# Patient Record
Sex: FEMALE | Race: OTHER | HISPANIC OR LATINO | URBAN - METROPOLITAN AREA
[De-identification: names, ages, dates, MRNs, and addresses within clinical notes are randomized per-mention and may not be internally consistent; named-entity substitution may affect disease eponyms.]

---

## 2017-12-28 ENCOUNTER — OUTPATIENT (OUTPATIENT)
Dept: OUTPATIENT SERVICES | Facility: HOSPITAL | Age: 37
LOS: 1 days | Discharge: HOME | End: 2017-12-28

## 2017-12-28 DIAGNOSIS — K70.30 ALCOHOLIC CIRRHOSIS OF LIVER WITHOUT ASCITES: ICD-10-CM

## 2018-08-16 ENCOUNTER — EMERGENCY (EMERGENCY)
Facility: HOSPITAL | Age: 38
LOS: 0 days | Discharge: HOME | End: 2018-08-16
Admitting: PHYSICIAN ASSISTANT

## 2018-08-16 VITALS
WEIGHT: 146.17 LBS | RESPIRATION RATE: 18 BRPM | OXYGEN SATURATION: 98 % | TEMPERATURE: 98 F | HEART RATE: 75 BPM | DIASTOLIC BLOOD PRESSURE: 65 MMHG | SYSTOLIC BLOOD PRESSURE: 122 MMHG

## 2018-08-16 VITALS — WEIGHT: 145.06 LBS | HEIGHT: 63 IN

## 2018-08-16 DIAGNOSIS — R21 RASH AND OTHER NONSPECIFIC SKIN ERUPTION: ICD-10-CM

## 2018-08-16 DIAGNOSIS — T40.2X5A ADVERSE EFFECT OF OTHER OPIOIDS, INITIAL ENCOUNTER: ICD-10-CM

## 2018-08-16 DIAGNOSIS — T39.1X5A ADVERSE EFFECT OF 4-AMINOPHENOL DERIVATIVES, INITIAL ENCOUNTER: ICD-10-CM

## 2018-08-16 RX ORDER — DIPHENHYDRAMINE HCL 50 MG
50 CAPSULE ORAL EVERY 4 HOURS
Qty: 0 | Refills: 0 | Status: DISCONTINUED | OUTPATIENT
Start: 2018-08-16 | End: 2018-08-16

## 2018-08-16 RX ORDER — FAMOTIDINE 10 MG/ML
20 INJECTION INTRAVENOUS DAILY
Qty: 0 | Refills: 0 | Status: DISCONTINUED | OUTPATIENT
Start: 2018-08-16 | End: 2018-08-16

## 2018-08-16 RX ADMIN — Medication 50 MILLIGRAM(S): at 13:47

## 2018-08-16 RX ADMIN — FAMOTIDINE 20 MILLIGRAM(S): 10 INJECTION INTRAVENOUS at 13:47

## 2018-08-16 RX ADMIN — Medication 60 MILLIGRAM(S): at 13:47

## 2018-08-16 NOTE — ED PROVIDER NOTE - PHYSICAL EXAMINATION
CONST: Well appearing in NAD  EYES: PERRL, EOMI, Sclera and conjunctiva clear.   ENT: No nasal discharge. TM's clear B/L without drainage. Oropharynx normal appearing, no erythema or exudates. Uvula midline.  NECK: Non-tender, no meningeal signs  CARD: Normal S1 S2; Normal rate and rhythm  RESP: Equal BS B/L, No wheezes, rhonchi or rales. No distress  GI: Soft, non-tender, non-distended.  MS: Normal ROM in all extremities. No midline spinal tenderness.  SKIN: hives noted on arms and torso.  NEURO: A&Ox3, No focal deficits. Strength 5/5 with no sensory deficits. Steady gait

## 2018-08-16 NOTE — ED PROVIDER NOTE - OBJECTIVE STATEMENT
Pt with percocet addiction took a tylenol w/ codeine this am to prevent withdrawal symtoms. About 15 mins after taking the codeine she developed burning and rash all over body. no SOB, NV or dizziness.

## 2018-08-16 NOTE — SBIRT NOTE. - NSSBIRTSERVICES_GEN_A_ED_FT
Provided SBIRT services: Full screen positive. Referral to Treatment Performed. Screening results were reviewed with the patient and patient was provided information about healthy guidelines and potential negative consequences associated with level of risk. Motivation and readiness to reduce or stop use was discussed and goals and activities to make changes were suggested/offered.   Referral for complete assessment and level of care determination at a certified treatment facility was completed by contacting the treatment facility, [facility] [address] [place] [address], [phone]. Appt confirmed for 8/16/18. Provided patient with referral information and she was in agreement with the plan. She reported her boyfriend will provide transportation to the program upon discharge.  Audit Score: 0  DAST Score: 8  Duration = # 30 Minutes

## 2018-08-16 NOTE — ED PROVIDER NOTE - NS ED ROS FT
CONST: No fever, chills or bodyaches  EYES: No pain, redness, drainage or visual changes.  ENT: No ear pain or discharge, nasal discharge or congestion. No sore throat  CARD: No chest pain, palpitations  RESP: No SOB, cough, hemoptysis. No hx of asthma or COPD  GI: No abdominal pain, N/V/D  MS: No joint pain, back pain or extremity pain/injury  NEURO: No headache, dizziness, paresthesias or LOC

## 2018-08-16 NOTE — ED ADULT NURSE NOTE - NSIMPLEMENTINTERV_GEN_ALL_ED
Implemented All Universal Safety Interventions:  [place] to call system. Call bell, personal items and telephone within reach. Instruct patient to call for assistance. Room bathroom lighting operational. Non-slip footwear when patient is off stretcher. Physically safe environment: no spills, clutter or unnecessary equipment. Stretcher in lowest position, wheels locked, appropriate side rails in place.

## 2018-08-16 NOTE — ED ADULT NURSE NOTE - OBJECTIVE STATEMENT
Pt c/o allergic reaction after taking tylenol-codeine at 1130. Pt c/o itchiness and burning sensation to b/l hands and feet. Denies SOB, or any other symptoms. Pt states she takes 10-15 percocet/a day.

## 2018-08-17 ENCOUNTER — OUTPATIENT (OUTPATIENT)
Dept: OUTPATIENT SERVICES | Facility: HOSPITAL | Age: 38
LOS: 1 days | Discharge: HOME | End: 2018-08-17

## 2018-08-17 DIAGNOSIS — F11.20 OPIOID DEPENDENCE, UNCOMPLICATED: ICD-10-CM

## 2019-05-01 ENCOUNTER — OUTPATIENT (OUTPATIENT)
Dept: OUTPATIENT SERVICES | Facility: HOSPITAL | Age: 39
LOS: 1 days | End: 2019-05-01
Payer: MEDICAID

## 2019-05-04 ENCOUNTER — INPATIENT (INPATIENT)
Facility: HOSPITAL | Age: 39
LOS: 4 days | Discharge: HOME | End: 2019-05-09
Attending: INTERNAL MEDICINE | Admitting: INTERNAL MEDICINE
Payer: MEDICAID

## 2019-05-04 VITALS
SYSTOLIC BLOOD PRESSURE: 144 MMHG | RESPIRATION RATE: 19 BRPM | HEART RATE: 160 BPM | DIASTOLIC BLOOD PRESSURE: 85 MMHG | TEMPERATURE: 98 F | HEIGHT: 63 IN | WEIGHT: 139.99 LBS | OXYGEN SATURATION: 99 %

## 2019-05-04 DIAGNOSIS — Z98.890 OTHER SPECIFIED POSTPROCEDURAL STATES: Chronic | ICD-10-CM

## 2019-05-04 LAB
ALBUMIN SERPL ELPH-MCNC: 4.7 G/DL — SIGNIFICANT CHANGE UP (ref 3.5–5.2)
ALP SERPL-CCNC: 74 U/L — SIGNIFICANT CHANGE UP (ref 30–115)
ALT FLD-CCNC: 15 U/L — SIGNIFICANT CHANGE UP (ref 0–41)
AMMONIA BLD-MCNC: 22 UMOL/L — SIGNIFICANT CHANGE UP (ref 11–55)
ANION GAP SERPL CALC-SCNC: 17 MMOL/L — HIGH (ref 7–14)
APAP SERPL-MCNC: <5 UG/ML — LOW (ref 10–30)
APPEARANCE UR: CLEAR — SIGNIFICANT CHANGE UP
APTT BLD: 28.5 SEC — SIGNIFICANT CHANGE UP (ref 27–39.2)
AST SERPL-CCNC: 53 U/L — HIGH (ref 0–41)
BACTERIA # UR AUTO: ABNORMAL
BASOPHILS # BLD AUTO: 0.06 K/UL — SIGNIFICANT CHANGE UP (ref 0–0.2)
BASOPHILS NFR BLD AUTO: 1.3 % — HIGH (ref 0–1)
BILIRUB SERPL-MCNC: 0.4 MG/DL — SIGNIFICANT CHANGE UP (ref 0.2–1.2)
BILIRUB UR-MCNC: NEGATIVE — SIGNIFICANT CHANGE UP
BUN SERPL-MCNC: 12 MG/DL — SIGNIFICANT CHANGE UP (ref 10–20)
CALCIUM SERPL-MCNC: 9.3 MG/DL — SIGNIFICANT CHANGE UP (ref 8.5–10.1)
CHLORIDE SERPL-SCNC: 99 MMOL/L — SIGNIFICANT CHANGE UP (ref 98–110)
CO2 SERPL-SCNC: 24 MMOL/L — SIGNIFICANT CHANGE UP (ref 17–32)
COLOR SPEC: YELLOW — SIGNIFICANT CHANGE UP
CREAT SERPL-MCNC: 0.6 MG/DL — LOW (ref 0.7–1.5)
DIFF PNL FLD: ABNORMAL
EOSINOPHIL # BLD AUTO: 0.12 K/UL — SIGNIFICANT CHANGE UP (ref 0–0.7)
EOSINOPHIL NFR BLD AUTO: 2.6 % — SIGNIFICANT CHANGE UP (ref 0–8)
EPI CELLS # UR: ABNORMAL /HPF
ETHANOL SERPL-MCNC: 330 MG/DL — HIGH
GLUCOSE SERPL-MCNC: 96 MG/DL — SIGNIFICANT CHANGE UP (ref 70–99)
GLUCOSE UR QL: NEGATIVE MG/DL — SIGNIFICANT CHANGE UP
HCT VFR BLD CALC: 35.7 % — LOW (ref 37–47)
HGB BLD-MCNC: 12.4 G/DL — SIGNIFICANT CHANGE UP (ref 12–16)
IMM GRANULOCYTES NFR BLD AUTO: 0.2 % — SIGNIFICANT CHANGE UP (ref 0.1–0.3)
INR BLD: 1.01 RATIO — SIGNIFICANT CHANGE UP (ref 0.65–1.3)
KETONES UR-MCNC: ABNORMAL
LEUKOCYTE ESTERASE UR-ACNC: NEGATIVE — SIGNIFICANT CHANGE UP
LIDOCAIN IGE QN: 24 U/L — SIGNIFICANT CHANGE UP (ref 7–60)
LYMPHOCYTES # BLD AUTO: 2 K/UL — SIGNIFICANT CHANGE UP (ref 1.2–3.4)
LYMPHOCYTES # BLD AUTO: 44 % — SIGNIFICANT CHANGE UP (ref 20.5–51.1)
MAGNESIUM SERPL-MCNC: 1.6 MG/DL — LOW (ref 1.8–2.4)
MCHC RBC-ENTMCNC: 32.4 PG — HIGH (ref 27–31)
MCHC RBC-ENTMCNC: 34.7 G/DL — SIGNIFICANT CHANGE UP (ref 32–37)
MCV RBC AUTO: 93.2 FL — SIGNIFICANT CHANGE UP (ref 81–99)
MONOCYTES # BLD AUTO: 0.29 K/UL — SIGNIFICANT CHANGE UP (ref 0.1–0.6)
MONOCYTES NFR BLD AUTO: 6.4 % — SIGNIFICANT CHANGE UP (ref 1.7–9.3)
NEUTROPHILS # BLD AUTO: 2.07 K/UL — SIGNIFICANT CHANGE UP (ref 1.4–6.5)
NEUTROPHILS NFR BLD AUTO: 45.5 % — SIGNIFICANT CHANGE UP (ref 42.2–75.2)
NITRITE UR-MCNC: NEGATIVE — SIGNIFICANT CHANGE UP
NRBC # BLD: 0 /100 WBCS — SIGNIFICANT CHANGE UP (ref 0–0)
PH UR: 6 — SIGNIFICANT CHANGE UP (ref 5–8)
PLATELET # BLD AUTO: 117 K/UL — LOW (ref 130–400)
POTASSIUM SERPL-MCNC: 3.9 MMOL/L — SIGNIFICANT CHANGE UP (ref 3.5–5)
POTASSIUM SERPL-SCNC: 3.9 MMOL/L — SIGNIFICANT CHANGE UP (ref 3.5–5)
PROT SERPL-MCNC: 7.9 G/DL — SIGNIFICANT CHANGE UP (ref 6–8)
PROT UR-MCNC: 100 MG/DL
PROTHROM AB SERPL-ACNC: 11.6 SEC — SIGNIFICANT CHANGE UP (ref 9.95–12.87)
RBC # BLD: 3.83 M/UL — LOW (ref 4.2–5.4)
RBC # FLD: 12.8 % — SIGNIFICANT CHANGE UP (ref 11.5–14.5)
RBC CASTS # UR COMP ASSIST: ABNORMAL /HPF
SALICYLATES SERPL-MCNC: <0.3 MG/DL — LOW (ref 4–30)
SODIUM SERPL-SCNC: 140 MMOL/L — SIGNIFICANT CHANGE UP (ref 135–146)
SP GR SPEC: 1.02 — SIGNIFICANT CHANGE UP (ref 1.01–1.03)
UROBILINOGEN FLD QL: 0.2 MG/DL — SIGNIFICANT CHANGE UP (ref 0.2–0.2)
WBC # BLD: 4.55 K/UL — LOW (ref 4.8–10.8)
WBC # FLD AUTO: 4.55 K/UL — LOW (ref 4.8–10.8)
WBC UR QL: SIGNIFICANT CHANGE UP /HPF

## 2019-05-04 PROCEDURE — 99285 EMERGENCY DEPT VISIT HI MDM: CPT

## 2019-05-04 RX ORDER — SODIUM CHLORIDE 9 MG/ML
1000 INJECTION INTRAMUSCULAR; INTRAVENOUS; SUBCUTANEOUS ONCE
Qty: 0 | Refills: 0 | Status: COMPLETED | OUTPATIENT
Start: 2019-05-04 | End: 2019-05-04

## 2019-05-04 NOTE — ED PROVIDER NOTE - ATTENDING CONTRIBUTION TO CARE
I personally evaluated the patient. I reviewed the Resident’s or Physician Assistant’s note (as assigned above), and agree with the findings and plan except as documented in my note.  Chart reviewed. Requesting detox from alcohol and opiates. Exam unremarkable. Will order detox protocol.

## 2019-05-04 NOTE — ED ADULT NURSE REASSESSMENT NOTE - NS ED NURSE REASSESS COMMENT FT1
PT IV symptomatic cold compress applied as per PA. PA wants pt to have oral fluids Pt refused. Pt has no symptoms of withdrawal observed at this time. PT HR 95 at this time. Pt states " MY heart rate is always high " pt observed on her phone talking to friend.

## 2019-05-04 NOTE — ED ADULT TRIAGE NOTE - CHIEF COMPLAINT QUOTE
As per patient I want detox from alcohol and opioids (percocet) and my psoriasis is bothering me" denies wanting to harm self. pt has hx of svt

## 2019-05-04 NOTE — ED ADULT NURSE NOTE - NSIMPLEMENTINTERV_GEN_ALL_ED
Implemented All Universal Safety Interventions:  [place] to call system. Call bell, personal items and telephone within reach. Instruct patient to call for assistance. Room bathroom lighting operational. Non-slip footwear when patient is off stretcher. Physically safe environment: no spills, clutter or unnecessary equipment. Stretcher in lowest position, wheels locked, appropriate side rails in place.

## 2019-05-04 NOTE — ED PROVIDER NOTE - CONSTITUTIONAL, MLM
normal... Well appearing, well nourished, awake, alert, oriented to person, place, time/situation and in no apparent distress. AOB

## 2019-05-05 DIAGNOSIS — I47.1 SUPRAVENTRICULAR TACHYCARDIA: ICD-10-CM

## 2019-05-05 DIAGNOSIS — F19.20 OTHER PSYCHOACTIVE SUBSTANCE DEPENDENCE, UNCOMPLICATED: ICD-10-CM

## 2019-05-05 LAB
HAV IGM SER-ACNC: SIGNIFICANT CHANGE UP
HBV CORE IGM SER-ACNC: SIGNIFICANT CHANGE UP
HBV SURFACE AG SER-ACNC: SIGNIFICANT CHANGE UP
HCV AB S/CO SERPL IA: 0.07 S/CO — SIGNIFICANT CHANGE UP (ref 0–0.99)
HCV AB SERPL-IMP: SIGNIFICANT CHANGE UP

## 2019-05-05 RX ORDER — MAGNESIUM HYDROXIDE 400 MG/1
30 TABLET, CHEWABLE ORAL ONCE
Qty: 0 | Refills: 0 | Status: DISCONTINUED | OUTPATIENT
Start: 2019-05-05 | End: 2019-05-09

## 2019-05-05 RX ORDER — PSEUDOEPHEDRINE HCL 30 MG
60 TABLET ORAL EVERY 6 HOURS
Qty: 0 | Refills: 0 | Status: DISCONTINUED | OUTPATIENT
Start: 2019-05-05 | End: 2019-05-09

## 2019-05-05 RX ORDER — METHADONE HYDROCHLORIDE 40 MG/1
TABLET ORAL
Qty: 0 | Refills: 0 | Status: COMPLETED | OUTPATIENT
Start: 2019-05-05 | End: 2019-05-08

## 2019-05-05 RX ORDER — METHOCARBAMOL 500 MG/1
500 TABLET, FILM COATED ORAL EVERY 6 HOURS
Qty: 0 | Refills: 0 | Status: DISCONTINUED | OUTPATIENT
Start: 2019-05-05 | End: 2019-05-09

## 2019-05-05 RX ORDER — HYDROXYZINE HCL 10 MG
100 TABLET ORAL AT BEDTIME
Qty: 0 | Refills: 0 | Status: DISCONTINUED | OUTPATIENT
Start: 2019-05-05 | End: 2019-05-09

## 2019-05-05 RX ORDER — METHADONE HYDROCHLORIDE 40 MG/1
10 TABLET ORAL ONCE
Qty: 0 | Refills: 0 | Status: DISCONTINUED | OUTPATIENT
Start: 2019-05-05 | End: 2019-05-05

## 2019-05-05 RX ORDER — TUBERCULIN PURIFIED PROTEIN DERIVATIVE 5 [IU]/.1ML
5 INJECTION, SOLUTION INTRADERMAL ONCE
Qty: 0 | Refills: 0 | Status: COMPLETED | OUTPATIENT
Start: 2019-05-05 | End: 2019-05-05

## 2019-05-05 RX ORDER — GUAIFENESIN/DEXTROMETHORPHAN 600MG-30MG
5 TABLET, EXTENDED RELEASE 12 HR ORAL EVERY 4 HOURS
Qty: 0 | Refills: 0 | Status: DISCONTINUED | OUTPATIENT
Start: 2019-05-05 | End: 2019-05-09

## 2019-05-05 RX ORDER — HYDROXYZINE HCL 10 MG
50 TABLET ORAL EVERY 6 HOURS
Qty: 0 | Refills: 0 | Status: DISCONTINUED | OUTPATIENT
Start: 2019-05-05 | End: 2019-05-09

## 2019-05-05 RX ORDER — IBUPROFEN 200 MG
400 TABLET ORAL EVERY 6 HOURS
Qty: 0 | Refills: 0 | Status: DISCONTINUED | OUTPATIENT
Start: 2019-05-05 | End: 2019-05-09

## 2019-05-05 RX ORDER — METHADONE HYDROCHLORIDE 40 MG/1
5 TABLET ORAL EVERY 12 HOURS
Qty: 0 | Refills: 0 | Status: DISCONTINUED | OUTPATIENT
Start: 2019-05-06 | End: 2019-05-08

## 2019-05-05 RX ORDER — MULTIVIT-MIN/FERROUS GLUCONATE 9 MG/15 ML
1 LIQUID (ML) ORAL DAILY
Qty: 0 | Refills: 0 | Status: DISCONTINUED | OUTPATIENT
Start: 2019-05-05 | End: 2019-05-09

## 2019-05-05 RX ORDER — ACETAMINOPHEN 500 MG
650 TABLET ORAL EVERY 4 HOURS
Qty: 0 | Refills: 0 | Status: DISCONTINUED | OUTPATIENT
Start: 2019-05-05 | End: 2019-05-09

## 2019-05-05 RX ORDER — MAGNESIUM OXIDE 400 MG ORAL TABLET 241.3 MG
400 TABLET ORAL
Qty: 0 | Refills: 0 | Status: COMPLETED | OUTPATIENT
Start: 2019-05-05 | End: 2019-05-05

## 2019-05-05 RX ORDER — FOLIC ACID 0.8 MG
1 TABLET ORAL DAILY
Qty: 0 | Refills: 0 | Status: DISCONTINUED | OUTPATIENT
Start: 2019-05-05 | End: 2019-05-09

## 2019-05-05 RX ORDER — METHADONE HYDROCHLORIDE 40 MG/1
10 TABLET ORAL EVERY 12 HOURS
Qty: 0 | Refills: 0 | Status: DISCONTINUED | OUTPATIENT
Start: 2019-05-05 | End: 2019-05-06

## 2019-05-05 RX ORDER — THIAMINE MONONITRATE (VIT B1) 100 MG
100 TABLET ORAL DAILY
Qty: 0 | Refills: 0 | Status: COMPLETED | OUTPATIENT
Start: 2019-05-05 | End: 2019-05-07

## 2019-05-05 RX ADMIN — METHADONE HYDROCHLORIDE 10 MILLIGRAM(S): 40 TABLET ORAL at 21:09

## 2019-05-05 RX ADMIN — Medication 25 MILLIGRAM(S): at 06:45

## 2019-05-05 RX ADMIN — METHADONE HYDROCHLORIDE 10 MILLIGRAM(S): 40 TABLET ORAL at 09:35

## 2019-05-05 RX ADMIN — Medication 300 MILLIGRAM(S): at 06:47

## 2019-05-05 RX ADMIN — Medication 25 MILLIGRAM(S): at 13:40

## 2019-05-05 RX ADMIN — Medication 30 MILLILITER(S): at 18:28

## 2019-05-05 RX ADMIN — MAGNESIUM OXIDE 400 MG ORAL TABLET 400 MILLIGRAM(S): 241.3 TABLET ORAL at 16:32

## 2019-05-05 RX ADMIN — Medication 25 MILLIGRAM(S): at 21:09

## 2019-05-05 RX ADMIN — Medication 400 MILLIGRAM(S): at 11:38

## 2019-05-05 RX ADMIN — Medication 25 MILLIGRAM(S): at 18:17

## 2019-05-05 RX ADMIN — MAGNESIUM OXIDE 400 MG ORAL TABLET 400 MILLIGRAM(S): 241.3 TABLET ORAL at 09:36

## 2019-05-05 RX ADMIN — Medication 25 MILLIGRAM(S): at 11:37

## 2019-05-05 RX ADMIN — METHOCARBAMOL 500 MILLIGRAM(S): 500 TABLET, FILM COATED ORAL at 18:17

## 2019-05-05 RX ADMIN — METHOCARBAMOL 500 MILLIGRAM(S): 500 TABLET, FILM COATED ORAL at 11:37

## 2019-05-05 RX ADMIN — Medication 1 MILLIGRAM(S): at 09:35

## 2019-05-05 RX ADMIN — TUBERCULIN PURIFIED PROTEIN DERIVATIVE 5 UNIT(S): 5 INJECTION, SOLUTION INTRADERMAL at 21:49

## 2019-05-05 RX ADMIN — Medication 25 MILLIGRAM(S): at 09:35

## 2019-05-05 RX ADMIN — Medication 1 TABLET(S): at 09:35

## 2019-05-05 RX ADMIN — Medication 400 MILLIGRAM(S): at 13:44

## 2019-05-05 RX ADMIN — Medication 100 MILLIGRAM(S): at 09:34

## 2019-05-05 NOTE — CHART NOTE - NSCHARTNOTEFT_GEN_A_CORE
PPD PLACED ON RFA, TO BE READ IN 48-72HRS PPD PLACED ON RFA, TO BE READ IN 48-72HRS    0MM induration 5/7/19

## 2019-05-05 NOTE — H&P ADULT - HISTORY OF PRESENT ILLNESS
· Chief Complaint: The patient is a 38y Female complaining of multiple medical complaints.	  · HPI Objective Statement: Patient request detox from ETOH drinks  liquor one liter daily  and percocets 5-10 pills  dailylast used today, last  detox 21/2yrs ago . H/o SVT, No fever, no suicidal ideations,	  · Presenting Symptoms: detox	  · Negative Findings: no nausea, no vomiting	  · Location: general	  · Timing: gradual onset	  · Duration: several mos	  · Quality: ETOH, percocet	  · Severity: MODERATE	    HIV:    HIV Status:  · Offered: Declined	    PAST MEDICAL/SURGICAL/FAMILY/SOCIAL HISTORY:    Past Medical History:  SVT (supraventricular tachycardia).     Past Surgical History:  H/O prior ablation treatment.     Tobacco Usage:  · Tobacco Usage	Current some day smoker	    ALLERGIES AND HOME MEDICATIONS:   Allergies:        Allergies:  	codeine: Drug, Rash    Home Medications:   * No Current Medications as of 04-May-2019 21:32 documented in Structured Notes    REVIEW OF SYSTEMS:    Review of Systems:  · CONSTITUTIONAL: no fever and no chills.	  · EYES: no discharge, no irritation, no pain, no redness, and no visual changes.	  · ENMT: Ears: no ear pain and no hearing problems.Nose: no nasal congestion and no nasal drainage.Mouth/Throat: no dysphagia, no hoarseness and no throat pain.Neck: no lumps, no pain, no stiffness and no swollen glands.	  · CARDIOVASCULAR: no chest pain and no edema.	  · RESPIRATORY: no chest pain, no cough, and no shortness of breath.	  · GASTROINTESTINAL: no abdominal pain, no bloating, no constipation, no diarrhea, no nausea and no vomiting.	  · GENITOURINARY: no dysuria, no frequency, and no hematuria.	  · MUSCULOSKELETAL: no back pain, no gout, no musculoskeletal pain, no neck pain, and no weakness.	  · NEURO: no loss of consciousness, no gait abnormality, no headache, no sensory deficits, and no weakness.	  · PSYCHIATRIC: no known mental health issues.	    PHYSICAL EXAM:   · CONSTITUTIONAL: Well appearing, well nourished, awake, alert, oriented to person, place, time/situation and in no apparent distress. AOB	  · ENMT: Airway patent, Nasal mucosa clear. Mouth with normal mucosa. Throat has no vesicles, no oropharyngeal exudates and uvula is midline.	  · EYES: Clear bilaterally, pupils equal, round and reactive to light.	  · CARDIAC: - - -	  · CARDIAC RHYTHM: regular	  · CARDIAC RATE: TACHYCARDIC	  · RESPIRATORY: Breath sounds clear and equal bilaterally.	  · GASTROINTESTINAL: Abdomen soft, non-tender, no guarding.	  · MUSCULOSKELETAL: Spine appears normal, range of motion is not limited, no muscle or joint tenderness	  · NEUROLOGICAL: Alert and oriented, no focal deficits, no motor or sensory deficits.	  · PSYCHIATRIC: Alert and oriented to person, place, time/situation. normal mood and affect. no apparent risk to self or others.	    CURRENT ORDERS/:   · 	sodium chloride 0.9% Bolus, 1000 milliLiter(s), IV Bolus, once, infuse over 1 Hour(s), Stop After 1 Doses  	Provider's Contact #: [phone], 04-May-2019, Active, 04-May-2019, Standard  · 	Urine Pregnancy Point Of Care Testing,   Frequency:  once, 04-May-2019, Active, Standard    RESULTS:   · EKG Date/Time: 04-May-2019 21:50	  · Rate: 94	  · Interpretation: normal sinus rhythm	  · QRS: 92	  · ST/T Wave: no acute changes

## 2019-05-05 NOTE — H&P ADULT - NSHPLABSRESULTS_GEN_ALL_CORE
12.4   4.55  )-----------( 117      ( 04 May 2019 22:00 )             35.7   05-04    140  |  99  |  12  ----------------------------<  96  3.9   |  24  |  0.6<L>    Ca    9.3      04 May 2019 22:00  Mg     1.6     05-04    TPro  7.9  /  Alb  4.7  /  TBili  0.4  /  DBili  x   /  AST  53<H>  /  ALT  15  /  AlkPhos  74  05-04

## 2019-05-06 LAB
AMPHET UR-MCNC: NEGATIVE — SIGNIFICANT CHANGE UP
BARBITURATES UR SCN-MCNC: NEGATIVE — SIGNIFICANT CHANGE UP
BENZODIAZ UR-MCNC: NEGATIVE — SIGNIFICANT CHANGE UP
COCAINE METAB.OTHER UR-MCNC: POSITIVE
DRUG SCREEN 1, URINE RESULT: SIGNIFICANT CHANGE UP
METHADONE UR-MCNC: NEGATIVE — SIGNIFICANT CHANGE UP
OPIATES UR-MCNC: NEGATIVE — SIGNIFICANT CHANGE UP
PCP UR-MCNC: NEGATIVE — SIGNIFICANT CHANGE UP
PROPOXYPHENE QUALITATIVE URINE RESULT: NEGATIVE — SIGNIFICANT CHANGE UP
T PALLIDUM AB TITR SER: NEGATIVE — SIGNIFICANT CHANGE UP
THC UR QL: NEGATIVE — SIGNIFICANT CHANGE UP

## 2019-05-06 RX ADMIN — Medication 20 MILLIGRAM(S): at 09:18

## 2019-05-06 RX ADMIN — Medication 20 MILLIGRAM(S): at 18:38

## 2019-05-06 RX ADMIN — Medication 100 MILLIGRAM(S): at 09:17

## 2019-05-06 RX ADMIN — METHADONE HYDROCHLORIDE 10 MILLIGRAM(S): 40 TABLET ORAL at 09:18

## 2019-05-06 RX ADMIN — Medication 20 MILLIGRAM(S): at 14:10

## 2019-05-06 RX ADMIN — Medication 20 MILLIGRAM(S): at 21:25

## 2019-05-06 RX ADMIN — Medication 20 MILLIGRAM(S): at 06:23

## 2019-05-06 RX ADMIN — METHADONE HYDROCHLORIDE 5 MILLIGRAM(S): 40 TABLET ORAL at 21:25

## 2019-05-06 RX ADMIN — Medication 1 MILLIGRAM(S): at 09:17

## 2019-05-06 RX ADMIN — Medication 1 TABLET(S): at 09:17

## 2019-05-07 DIAGNOSIS — Z71.89 OTHER SPECIFIED COUNSELING: ICD-10-CM

## 2019-05-07 RX ADMIN — Medication 1 MILLIGRAM(S): at 09:32

## 2019-05-07 RX ADMIN — Medication 1 TABLET(S): at 09:32

## 2019-05-07 RX ADMIN — Medication 15 MILLIGRAM(S): at 13:17

## 2019-05-07 RX ADMIN — METHADONE HYDROCHLORIDE 5 MILLIGRAM(S): 40 TABLET ORAL at 09:31

## 2019-05-07 RX ADMIN — Medication 100 MILLIGRAM(S): at 09:32

## 2019-05-07 RX ADMIN — TUBERCULIN PURIFIED PROTEIN DERIVATIVE 5 UNIT(S): 5 INJECTION, SOLUTION INTRADERMAL at 10:02

## 2019-05-07 RX ADMIN — METHADONE HYDROCHLORIDE 5 MILLIGRAM(S): 40 TABLET ORAL at 20:24

## 2019-05-07 RX ADMIN — Medication 15 MILLIGRAM(S): at 06:41

## 2019-05-07 RX ADMIN — Medication 15 MILLIGRAM(S): at 21:09

## 2019-05-07 RX ADMIN — Medication 15 MILLIGRAM(S): at 17:21

## 2019-05-07 RX ADMIN — Medication 15 MILLIGRAM(S): at 09:31

## 2019-05-07 NOTE — CHART NOTE - NSCHARTNOTEFT_GEN_A_CORE
Subsequent Inpatient Encounter                                       Detox Unit    GABRIELA MENDEZ   38y   Female      Chief Complaint:    Follow up for Polysubstance  Dependency    HPI:     I reviewed previous notes. No Change, except if noted below.             Detail:_    ROS:   I reviewed with patient.  No changes from previous notes except if noted below.             Detail: _    PFSH I reviewed with patient. No changes from previous notes except if noted below.             Detail_    Medication reconciliation performed.    MEDICATIONS  (STANDING):  chlordiazePOXIDE 15 milliGRAM(s) Oral every 4 hours  chlordiazePOXIDE   Oral   folic acid 1 milliGRAM(s) Oral daily  methadone    Tablet 5 milliGRAM(s) Oral every 12 hours  methadone    Tablet   Oral   multivitamin/minerals 1 Tablet(s) Oral daily  thiamine 100 milliGRAM(s) Oral daily      MEDICATIONS  (PRN):  acetaminophen   Tablet .. 650 milliGRAM(s) Oral every 4 hours PRN Temp greater or equal to 38.5C (101.3F), Moderate Pain (4 - 6)  aluminum hydroxide/magnesium hydroxide/simethicone Suspension 30 milliLiter(s) Oral every 6 hours PRN Heartburn  bismuth subsalicylate Liquid 30 milliLiter(s) Oral every 6 hours PRN Diarrhea  chlordiazePOXIDE 25 milliGRAM(s) Oral every 4 hours PRN Withdrawal  cloNIDine 0.1 milliGRAM(s) Oral every 8 hours PRN Blood Pressure GREATER THAN 140/90 mmHG  guaiFENesin/dextromethorphan  Syrup 5 milliLiter(s) Oral every 4 hours PRN Cough  hydrOXYzine hydrochloride 50 milliGRAM(s) Oral every 6 hours PRN Anxiety  hydrOXYzine hydrochloride 100 milliGRAM(s) Oral at bedtime PRN insomnia  ibuprofen  Tablet. 400 milliGRAM(s) Oral every 6 hours PRN Mild Pain (1 - 3)  magnesium hydroxide Suspension 30 milliLiter(s) Oral once PRN Constipation  methocarbamol 500 milliGRAM(s) Oral every 6 hours PRN muscle pain  pseudoephedrine 60 milliGRAM(s) Oral every 6 hours PRN Rhinitis  trimethobenzamide 300 milliGRAM(s) Oral every 6 hours PRN Nausea and/or Vomiting  trimethobenzamide Injectable 200 milliGRAM(s) IntraMuscular every 6 hours PRN Nausea and/or Vomiting      T(C): 36.3 (05-07-19 @ 06:00), Max: 36.3 (05-06-19 @ 11:58)  HR: 69 (05-07-19 @ 06:00) (69 - 95)  BP: 106/63 (05-07-19 @ 06:00) (106/63 - 137/68)  RR: 16 (05-07-19 @ 06:00) (16 - 16)  SpO2: --    PHYSICAL EXAM:      Constitutional: NAD, A&O x3    Eyes: PERRLA, no conjuctivitis    Neck: no lymphadenopathy    Respiratory: +air entry, no rales, no rhonchi, no wheezes    Cardiovascular: +S1 and S2, regular rate and rhythm    Gastrointestinal: +BS, soft, non-tender, not distended    Extremities:  no edema, no calf tenderness    Skin: no rashes, normal turgor            Magnesium, Serum: 1.6 mg/dL (05-04-19 @ 22:00)  Ammonia, Serum: 22 umol/L (05-04-19 @ 22:00)  Treponema Pallidum Antibody Interpretation: Negative (05-04-19 @ 22:00)  Hepatitis B Surface Antigen: Nonreact (05-04-19 @ 22:00)  Hepatitis C Virus S/CO Ratio: 0.07 S/CO (05-04-19 @ 22:00)    Hepatitis C Virus Interpretation: Nonreact (05-04-19 @ 22:00)      Drug Screen 1, Urine Result: Done (05-04-19 @ 22:00)        Impression and Plan:    Primary Diagnosis:  EtOH/Opiate Dependency                                Medication: Librium/Methadone Protocol    Secondary Diagnosis:                                                                                Medication:    Tertiary Diagnosis:                                                                                     Medication:      Continue Detox Protocols. Use of PRNS as needed for withdrawal and comfort.    Adjustments to protocols:    Labs/ Tests reviewed.    Tests ordered:     Likely Disposition: ___Home       ___Rehab       ___Outpatient Program    ___Self Help     _____Other    Estimated Length of stay:__4__

## 2019-05-08 RX ORDER — HYDROCORTISONE 1 %
1 OINTMENT (GRAM) TOPICAL
Qty: 0 | Refills: 0 | Status: DISCONTINUED | OUTPATIENT
Start: 2019-05-08 | End: 2019-05-09

## 2019-05-08 RX ADMIN — Medication 10 MILLIGRAM(S): at 06:38

## 2019-05-08 RX ADMIN — Medication 10 MILLIGRAM(S): at 18:10

## 2019-05-08 RX ADMIN — METHADONE HYDROCHLORIDE 5 MILLIGRAM(S): 40 TABLET ORAL at 09:19

## 2019-05-08 RX ADMIN — METHOCARBAMOL 500 MILLIGRAM(S): 500 TABLET, FILM COATED ORAL at 21:00

## 2019-05-08 RX ADMIN — Medication 400 MILLIGRAM(S): at 21:30

## 2019-05-08 RX ADMIN — Medication 1 APPLICATION(S): at 20:54

## 2019-05-08 RX ADMIN — Medication 1 TABLET(S): at 09:19

## 2019-05-08 RX ADMIN — Medication 10 MILLIGRAM(S): at 20:54

## 2019-05-08 RX ADMIN — Medication 10 MILLIGRAM(S): at 09:19

## 2019-05-08 RX ADMIN — Medication 1 APPLICATION(S): at 13:15

## 2019-05-08 RX ADMIN — Medication 400 MILLIGRAM(S): at 21:00

## 2019-05-08 RX ADMIN — Medication 10 MILLIGRAM(S): at 13:14

## 2019-05-08 RX ADMIN — Medication 1 MILLIGRAM(S): at 09:19

## 2019-05-08 NOTE — CHART NOTE - NSCHARTNOTEFT_GEN_A_CORE
Allergies:  codeine (Rash)      Diet: Regular    Activity: as tolerated    Follow up with    1. PMD in 2 weeks    2. Psych in 2 weeks    3.    Follow up for abnormal labs/tests    1.    Extra Instructions:      Flu Vaccine given  Yes_____         No______      Diagnosis:  Chemical Dependency   Maintain sobriety  refrain from all use      Patient Signature___________________________________________  Date_________________      Nurse Signature_____________________________________________Date_________________

## 2019-05-09 VITALS
HEART RATE: 73 BPM | TEMPERATURE: 97 F | RESPIRATION RATE: 16 BRPM | DIASTOLIC BLOOD PRESSURE: 59 MMHG | SYSTOLIC BLOOD PRESSURE: 103 MMHG

## 2019-05-09 RX ADMIN — Medication 1 APPLICATION(S): at 08:14

## 2019-05-09 RX ADMIN — Medication 1 TABLET(S): at 08:14

## 2019-05-09 RX ADMIN — Medication 1 MILLIGRAM(S): at 08:14

## 2019-05-13 DIAGNOSIS — F10.20 ALCOHOL DEPENDENCE, UNCOMPLICATED: ICD-10-CM

## 2019-05-13 DIAGNOSIS — Z88.8 ALLERGY STATUS TO OTHER DRUGS, MEDICAMENTS AND BIOLOGICAL SUBSTANCES STATUS: ICD-10-CM

## 2019-05-13 DIAGNOSIS — F11.20 OPIOID DEPENDENCE, UNCOMPLICATED: ICD-10-CM

## 2019-05-13 DIAGNOSIS — F17.210 NICOTINE DEPENDENCE, CIGARETTES, UNCOMPLICATED: ICD-10-CM

## 2019-05-14 DIAGNOSIS — Z76.89 PERSONS ENCOUNTERING HEALTH SERVICES IN OTHER SPECIFIED CIRCUMSTANCES: ICD-10-CM

## 2019-07-01 ENCOUNTER — OUTPATIENT (OUTPATIENT)
Dept: OUTPATIENT SERVICES | Facility: HOSPITAL | Age: 39
LOS: 1 days | End: 2019-07-01
Payer: MEDICAID

## 2019-07-01 DIAGNOSIS — Z98.890 OTHER SPECIFIED POSTPROCEDURAL STATES: Chronic | ICD-10-CM

## 2019-07-01 PROCEDURE — G9005: CPT

## 2019-07-12 ENCOUNTER — EMERGENCY (EMERGENCY)
Facility: HOSPITAL | Age: 39
LOS: 0 days | Discharge: HOME | End: 2019-07-12
Attending: EMERGENCY MEDICINE | Admitting: EMERGENCY MEDICINE
Payer: MEDICAID

## 2019-07-12 VITALS
RESPIRATION RATE: 18 BRPM | HEART RATE: 144 BPM | TEMPERATURE: 98 F | DIASTOLIC BLOOD PRESSURE: 73 MMHG | SYSTOLIC BLOOD PRESSURE: 132 MMHG | OXYGEN SATURATION: 98 %

## 2019-07-12 VITALS
SYSTOLIC BLOOD PRESSURE: 133 MMHG | RESPIRATION RATE: 18 BRPM | HEART RATE: 107 BPM | DIASTOLIC BLOOD PRESSURE: 87 MMHG | OXYGEN SATURATION: 98 %

## 2019-07-12 DIAGNOSIS — F19.10 OTHER PSYCHOACTIVE SUBSTANCE ABUSE, UNCOMPLICATED: ICD-10-CM
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
## 2019-07-12 DIAGNOSIS — L40.9 PSORIASIS, UNSPECIFIED: ICD-10-CM

## 2019-07-12 DIAGNOSIS — R21 RASH AND OTHER NONSPECIFIC SKIN ERUPTION: ICD-10-CM

## 2019-07-12 DIAGNOSIS — Z98.890 OTHER SPECIFIED POSTPROCEDURAL STATES: Chronic | ICD-10-CM
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
## 2019-07-12 DIAGNOSIS — Z88.5 ALLERGY STATUS TO NARCOTIC AGENT: ICD-10-CM

## 2019-07-12 DIAGNOSIS — S60.561A INSECT BITE (NONVENOMOUS) OF RIGHT HAND, INITIAL ENCOUNTER: ICD-10-CM
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
## 2019-07-12 PROCEDURE — 99283 EMERGENCY DEPT VISIT LOW MDM: CPT

## 2019-07-12 PROCEDURE — 93010 ELECTROCARDIOGRAM REPORT: CPT

## 2019-07-12 RX ORDER — HALOBETASOL PROPIONATE 0.5 MG/G
1 OINTMENT TOPICAL
Qty: 1 | Refills: 0
Start: 2019-07-12 | End: 2019-07-25

## 2019-07-12 NOTE — ED PROVIDER NOTE - ATTENDING CONTRIBUTION TO CARE
39 y/o female with hx of polysubstance abuse, psoriasis presents to the ED for progression of psoriatic rash x 1 month, also with suspected insect bite to right dorsum of hand sustained yesterday.  No other complaints.  PE:  agree with above.  A/P:  Psoriasis/Insect Bite, no signs of superinfection.  D/C with steroid cream and benadryl.  F/U w/ Derm.

## 2019-07-12 NOTE — ED PROVIDER NOTE - CARE PLAN
Principal Discharge DX:	Psoriasis  Secondary Diagnosis:	Insect bite  Secondary Diagnosis:	Substance abuse

## 2019-07-12 NOTE — ED PROVIDER NOTE - OBJECTIVE STATEMENT
p 38 yold female to Ed [facility] Psoriasis, substance abuse c/o worsening of psoriasis rash x 1 month and insect bite to right dorsal hand x 1 day; pt frustrated about #, size and progression of psoriasis lesions; Was on ? cream with improvement but ran out and did not follow up with derm; pt admits to excessive time in sun, cholerine pools and recently opiods/alcohol;

## 2019-07-12 NOTE — ED PROVIDER NOTE - NSFOLLOWUPINSTRUCTIONS_ED_ALL_ED_FT
Insect Bite, Adult    An insect bite can make your skin red, itchy, and swollen. An insect bite is different from an insect sting, which happens when an insect injects poison (venom) into the skin.    Some insects can spread disease to people through a bite. However, most insect bites do not lead to disease and are not serious.    What are the causes?  Insects may bite for a variety of reasons, including:    Hunger.  To defend themselves.    Insects that bite include:    Spiders.  Mosquitoes.  Ticks.  Fleas.  Ants.  Flies.  Bedbugs.    What are the signs or symptoms?  Symptoms of this condition include:    Itching or pain in the bite area.  Redness and swelling in the bite area.  An open wound (skin ulcer).    In many cases, symptoms last for 2–4 days.    How is this diagnosed?  This condition is usually diagnosed based on symptoms and a physical exam.    How is this treated?  Treatment is usually not needed. Symptoms often go away on their own. When treatment is recommended, it may involve:    Applying a cream or lotion to the bitten area. This treatment helps with itching.  Taking an antibiotic medicine. This treatment is needed if the bite area gets infected.  Getting a tetanus shot.  Applying ice to the affected area.  Medicines called antihistamines. This treatment is needed if you develop an allergic reaction to the insect bite.    Follow these instructions at home:  Bite area care     Do not scratch the bite area.  Keep the bite area clean and dry. Wash it every day with soap and water as told by your health care provider.  Check the bite area every day for signs of infection. Check for:    More redness, swelling, or pain.  Fluid or blood.  Warmth.  Pus.    Managing pain, itching, and swelling       You may apply a baking soda paste, cortisone cream, or calamine lotion to the bite area as told by your health care provider.  If directed, apply ice to the bite area.    Put ice in a plastic bag.  Place a towel between your skin and the bag.  Leave the ice on for 20 minutes, 2–3 times per day.    Medicines     Apply or take over-the-counter and prescription medicines only as told by your health care provider.  If you were prescribed an antibiotic medicine, use it as told by your health care provider. Do not stop using the antibiotic even if your condition improves.  General instructions     Keep all follow-up visits as told by your health care provider. This is important.  How is this prevented?  To help reduce your risk of insect bites:    When you are outdoors, wear clothing that covers your arms and legs.  Use insect repellent. The best insect repellents contain:    DEET, picaridin, oil of lemon eucalyptus (OLE), or [id number].  Higher amounts of an active ingredient.    If your home windows do not have screens, consider installing them.    Contact a health care provider if:  You have more redness, swelling, or pain in the bite area.  You have fluid, blood, or pus coming from the bite area.  The bite area feels warm to the touch.  You have a fever.  Get help right away if:  You have joint pain.  You have a rash.  You have shortness of breath.  You feel unusually tired or sleepy.  You have neck pain.  You have a headache.  You have unusual weakness.  You have chest pain.  You have nausea, vomiting, or pain in the abdomen.        You have a history of psoriasis, and should continue to take your medications as prescribed and follow up with your physician in the outpatient setting.      Alcohol Use Disorder  Alcohol use disorder is when your drinking disrupts your daily life. When you have this condition, you drink too much alcohol and you cannot control your drinking.    Alcohol use disorder can cause serious problems with your physical health. It can affect your brain, heart, liver, pancreas, immune system, stomach, and intestines. Alcohol use disorder can increase your risk for certain cancers and cause problems with your mental health, such as depression, anxiety, psychosis, delirium, and dementia. People with this disorder risk hurting themselves and others.    What are the causes?  This condition is caused by drinking too much alcohol over time. It is not caused by drinking too much alcohol only one or two times. Some people with this condition drink alcohol to cope with or escape from negative life events. Others drink to relieve pain or symptoms of mental illness.    What increases the risk?  You are more likely to develop this condition if:    You have a family history of alcohol use disorder.  Your culture encourages drinking to the point of intoxication, or makes alcohol easy to get.  You had a mood or conduct disorder in childhood.  You have been a victim of abuse.  You are an adolescent and:    You have poor grades or difficulties in school.  Your caregivers do not talk to you about saying no to alcohol, or supervise your activities.  You are impulsive or you have trouble with self-control.      What are the signs or symptoms?  Symptoms of this condition include:    Drinking more than you want to.  Drinking for longer than you want to.  Trying several times to drink less or to control your drinking.  Spending a lot of time getting alcohol, drinking, or recovering from drinking.  Craving alcohol.  Having problems at work, at school, or at home due to drinking.  Having problems in relationships due to drinking.  Drinking when it is dangerous to drink, such as before driving a car.  Continuing to drink even though you know you might have a physical or mental problem related to drinking.  Needing more and more alcohol to get the same effect you want from the alcohol (building up tolerance).  Having symptoms of withdrawal when you stop drinking. Symptoms of withdrawal include:    Fatigue.  Nightmares.  Trouble sleeping.  Depression.  Anxiety.  Fever.  Seizures.  Severe confusion.  Feeling or seeing things that are not there (hallucinations).  Tremors.  Rapid heart rate.  Rapid breathing.  High blood pressure.    Drinking to avoid symptoms of withdrawal.    How is this diagnosed?  This condition is diagnosed with an assessment. Your health care provider may start the assessment by asking three or four questions about your drinking.    Your health care provider may perform a physical exam or do lab tests to see if you have physical problems resulting from alcohol use. She or he may refer you to a mental health professional for evaluation.    How is this treated?  Some people with alcohol use disorder are able to reduce their alcohol use to low-risk levels. Others need to completely quit drinking alcohol. When necessary, mental health professionals with specialized training in substance use treatment can help. Your health care provider can help you decide how severe your alcohol use disorder is and what type of treatment you need. The following forms of treatment are available:    Detoxification. Detoxification involves quitting drinking and using prescription medicines within the first week to help lessen withdrawal symptoms. This treatment is important for people who have had withdrawal symptoms before and for heavy drinkers who are likely to have withdrawal symptoms. Alcohol withdrawal can be dangerous, and in severe cases, it can cause death. Detoxification may be provided in a home, community, or primary care setting, or in a hospital or substance use treatment facility.  Counseling. This treatment is also called talk therapy. It is provided by substance use treatment counselors. A counselor can address the reasons you use alcohol and suggest ways to keep you from drinking again or to prevent problem drinking. The goals of talk therapy are to:    Find healthy activities and ways for you to cope with stress.  Identify and avoid the things that trigger your alcohol use.  Help you learn how to handle cravings.    Medicines. Medicines can help treat alcohol use disorder by:    Decreasing alcohol cravings.  Decreasing the positive feeling you have when you drink alcohol.  Causing an uncomfortable physical reaction when you drink alcohol (aversion therapy).    Support groups. Support groups are led by people who have quit drinking. They provide emotional support, advice, and guidance.    ImageThese forms of treatment are often combined. Some people with this condition benefit from a combination of treatments provided by specialized substance use treatment centers.    Follow these instructions at home:  Take over-the-counter and prescription medicines only as told by your health care provider.  Check with your health care provider before starting any new medicines.  Ask friends and family members not to offer you alcohol.  Avoid situations where alcohol is served, including gatherings where others are drinking alcohol.  Create a plan for what to do when you are tempted to use alcohol.  Find hobbies or activities that you enjoy that do not include alcohol.  Keep all follow-up visits as told by your health care provider. This is important.  How is this prevented?  If you drink, limit alcohol intake to no more than 1 drink a day for nonpregnant women and 2 drinks a day for men. One drink equals 12 oz of beer, 5 oz of wine, or 1½ oz of hard liquor.  If you have a mental health condition, get treatment and support.  Do not give alcohol to adolescents.  If you are an adolescent:    Do not drink alcohol.  Do not be afraid to say no if someone offers you alcohol. Speak up about why you do not want to drink. You can be a positive role model for your friends and set a good example for those around you by not drinking alcohol.  If your friends drink, spend time with others who do not drink alcohol. Make new friends who do not use alcohol.  Find healthy ways to manage stress and emotions, such as meditation or deep breathing, exercise, spending time in nature, listening to music, or talking with a trusted friend or family member.    Contact a health care provider if:  You are not able to take your medicines as told.  Your symptoms get worse.  You return to drinking alcohol (relapse) and your symptoms get worse.  Get help right away if:  You have thoughts about hurting yourself or others.  If you ever feel like you may hurt yourself or others, or have thoughts about taking your own life, get help right away. You can go to your nearest emergency department or call:     Your local emergency services (911 in the [place]   A suicide crisis helpline, such as the National Suicide Prevention Lifeline at 1-966.351.5714. This is open 24 hours a day.     Summary  Alcohol use disorder is when your drinking disrupts your daily life. When you have this condition, you drink too much alcohol and you cannot control your drinking.  Treatment may include detoxification, counseling, medicine, and support groups.  Ask friends and family members not to offer you alcohol. Avoid situations where alcohol is served.  Get help right away if you have thoughts about hurting yourself or others.  This information is not intended to replace advice given to you by your health care provider. Make sure you discuss any questions you have with your health care provider.

## 2019-07-12 NOTE — ED ADULT TRIAGE NOTE - CHIEF COMPLAINT QUOTE
I got bit by something last night and my right hand is swollen, I went to a pool in [place] Monday and since then I have these spots on my leg that are burning ans I have SVT - patient

## 2019-07-12 NOTE — ED ADULT NURSE NOTE - OBJECTIVE STATEMENT
Pt with C/O right hand skin redness swollen for 2 days , left thumb  skin redness pain ,B/L calves rashes rash  skin excoriation for 1 week

## 2019-07-12 NOTE — ED PROVIDER NOTE - NSFOLLOWUPCLINICS_GEN_ALL_ED_FT
[facility]  Detox Mgmt  [address] [place] [address]  Phone: [phone]  Fax:   Follow Up Time: 1-3 Days    [facility]  Dermatology  [address] [place] [id number]  Phone: [phone]  Fax: [phone]  Follow Up Time: 1-3 Days

## 2019-07-12 NOTE — ED PROVIDER NOTE - PROGRESS NOTE DETAILS
pt given steroid cream for psoriasis rash; pt offerd detox at [facility](s) refused; and detox # given to f/u with outpt treatment;

## 2019-07-12 NOTE — ED PROVIDER NOTE - PHYSICAL EXAMINATION
Constitutional: Well developed, well nourished. NAD  Head: Normocephalic, atraumatic.  Eyes: PERRL, EOMI.  ENT: No nasal discharge. Mucous membranes dry.  Neck: Supple. Painless ROM.  Cardiovascular:  Regular rate and rhythm. pt not currently tachycardic HR 90s  Pulmonary: Lungs clear to auscultation bilaterally. No wheezing, rales, or rhonchi.  Abdominal: Soft. Nondistended. No rebound, guarding, rigidity.  Extremities. Pelvis stable. + 3-4 cm irregular patches noted to bilat lower extremities with redness and scaling c/w psoriasis; + mild swelling and erythema noted to right hand dorsum - no central bite lesion; no lymphangitis;  Skin: No rashes, cyanosis.  Neuro: AAOx3. No focal neurological deficits.  Psych: Normal mood. Normal affect.

## 2019-07-12 NOTE — ED ADULT NURSE NOTE - NSIMPLEMENTINTERV_GEN_ALL_ED
Implemented All Fall Risk Interventions:  [place] to call system. Call bell, personal items and telephone within reach. Instruct patient to call for assistance. Room bathroom lighting operational. Non-slip footwear when patient is off stretcher. Physically safe environment: no spills, clutter or unnecessary equipment. Stretcher in lowest position, wheels locked, appropriate side rails in place. Provide visual cue, wrist band, yellow gown, etc. Monitor gait and stability. Monitor for mental status changes and reorient to person, place, and time. Review medications for side effects contributing to fall risk. Reinforce activity limits and safety measures with patient and family.

## 2019-07-13 PROBLEM — I47.1 SUPRAVENTRICULAR TACHYCARDIA: Chronic | Status: ACTIVE | Noted: 2019-05-04

## 2019-07-13 PROBLEM — F19.20 OTHER PSYCHOACTIVE SUBSTANCE DEPENDENCE, UNCOMPLICATED: Chronic | Status: ACTIVE | Noted: 2019-05-05

## 2019-08-01 ENCOUNTER — OUTPATIENT (OUTPATIENT)
Dept: OUTPATIENT SERVICES | Facility: HOSPITAL | Age: 39
LOS: 1 days | End: 2019-08-01
Payer: MEDICAID

## 2019-08-01 DIAGNOSIS — Z98.890 OTHER SPECIFIED POSTPROCEDURAL STATES: Chronic | ICD-10-CM

## 2019-09-03 DIAGNOSIS — Z71.89 OTHER SPECIFIED COUNSELING: ICD-10-CM

## 2019-10-01 ENCOUNTER — INPATIENT (INPATIENT)
Facility: HOSPITAL | Age: 39
LOS: 3 days | Discharge: HOME | End: 2019-10-05
Attending: INTERNAL MEDICINE | Admitting: INTERNAL MEDICINE
Payer: MEDICAID

## 2019-10-01 VITALS
HEART RATE: 156 BPM | HEIGHT: 63 IN | DIASTOLIC BLOOD PRESSURE: 89 MMHG | WEIGHT: 139.99 LBS | TEMPERATURE: 98 F | RESPIRATION RATE: 20 BRPM | OXYGEN SATURATION: 99 % | SYSTOLIC BLOOD PRESSURE: 130 MMHG

## 2019-10-01 DIAGNOSIS — I47.1 SUPRAVENTRICULAR TACHYCARDIA: ICD-10-CM

## 2019-10-01 DIAGNOSIS — F19.20 OTHER PSYCHOACTIVE SUBSTANCE DEPENDENCE, UNCOMPLICATED: ICD-10-CM

## 2019-10-01 DIAGNOSIS — Z98.890 OTHER SPECIFIED POSTPROCEDURAL STATES: Chronic | ICD-10-CM

## 2019-10-01 LAB
ALBUMIN SERPL ELPH-MCNC: 4.9 G/DL — SIGNIFICANT CHANGE UP (ref 3.5–5.2)
ALP SERPL-CCNC: 75 U/L — SIGNIFICANT CHANGE UP (ref 30–115)
ALT FLD-CCNC: 27 U/L — SIGNIFICANT CHANGE UP (ref 0–41)
AMPHET UR-MCNC: NEGATIVE — SIGNIFICANT CHANGE UP
ANION GAP SERPL CALC-SCNC: 12 MMOL/L — SIGNIFICANT CHANGE UP (ref 7–14)
APAP SERPL-MCNC: 15 UG/ML — SIGNIFICANT CHANGE UP (ref 10–30)
APPEARANCE UR: CLEAR — SIGNIFICANT CHANGE UP
AST SERPL-CCNC: 139 U/L — HIGH (ref 0–41)
BACTERIA # UR AUTO: ABNORMAL
BARBITURATES UR SCN-MCNC: NEGATIVE — SIGNIFICANT CHANGE UP
BASOPHILS # BLD AUTO: 0.06 K/UL — SIGNIFICANT CHANGE UP (ref 0–0.2)
BASOPHILS NFR BLD AUTO: 1 % — SIGNIFICANT CHANGE UP (ref 0–1)
BENZODIAZ UR-MCNC: NEGATIVE — SIGNIFICANT CHANGE UP
BILIRUB DIRECT SERPL-MCNC: 0.2 MG/DL — SIGNIFICANT CHANGE UP (ref 0–0.2)
BILIRUB INDIRECT FLD-MCNC: 0.3 MG/DL — SIGNIFICANT CHANGE UP (ref 0.2–1.2)
BILIRUB SERPL-MCNC: 0.5 MG/DL — SIGNIFICANT CHANGE UP (ref 0.2–1.2)
BILIRUB UR-MCNC: NEGATIVE — SIGNIFICANT CHANGE UP
BUN SERPL-MCNC: 7 MG/DL — LOW (ref 10–20)
CALCIUM SERPL-MCNC: 9.3 MG/DL — SIGNIFICANT CHANGE UP (ref 8.5–10.1)
CHLORIDE SERPL-SCNC: 100 MMOL/L — SIGNIFICANT CHANGE UP (ref 98–110)
CK SERPL-CCNC: 235 U/L — HIGH (ref 0–225)
CO2 SERPL-SCNC: 28 MMOL/L — SIGNIFICANT CHANGE UP (ref 17–32)
COCAINE METAB.OTHER UR-MCNC: POSITIVE
COLOR SPEC: YELLOW — SIGNIFICANT CHANGE UP
COMMENT - URINE: SIGNIFICANT CHANGE UP
CREAT SERPL-MCNC: 0.7 MG/DL — SIGNIFICANT CHANGE UP (ref 0.7–1.5)
DIFF PNL FLD: ABNORMAL
DRUG SCREEN 1, URINE RESULT: SIGNIFICANT CHANGE UP
EOSINOPHIL # BLD AUTO: 0.12 K/UL — SIGNIFICANT CHANGE UP (ref 0–0.7)
EOSINOPHIL NFR BLD AUTO: 1.9 % — SIGNIFICANT CHANGE UP (ref 0–8)
EPI CELLS # UR: ABNORMAL /HPF
ETHANOL SERPL-MCNC: 322 MG/DL — HIGH
GLUCOSE SERPL-MCNC: 102 MG/DL — HIGH (ref 70–99)
GLUCOSE UR QL: NEGATIVE MG/DL — SIGNIFICANT CHANGE UP
HCT VFR BLD CALC: 36.7 % — LOW (ref 37–47)
HGB BLD-MCNC: 13.2 G/DL — SIGNIFICANT CHANGE UP (ref 12–16)
IMM GRANULOCYTES NFR BLD AUTO: 0.2 % — SIGNIFICANT CHANGE UP (ref 0.1–0.3)
KETONES UR-MCNC: NEGATIVE — SIGNIFICANT CHANGE UP
LEUKOCYTE ESTERASE UR-ACNC: NEGATIVE — SIGNIFICANT CHANGE UP
LIDOCAIN IGE QN: 20 U/L — SIGNIFICANT CHANGE UP (ref 7–60)
LYMPHOCYTES # BLD AUTO: 2.79 K/UL — SIGNIFICANT CHANGE UP (ref 1.2–3.4)
LYMPHOCYTES # BLD AUTO: 45.1 % — SIGNIFICANT CHANGE UP (ref 20.5–51.1)
MAGNESIUM SERPL-MCNC: 1.6 MG/DL — LOW (ref 1.8–2.4)
MCHC RBC-ENTMCNC: 35.8 PG — HIGH (ref 27–31)
MCHC RBC-ENTMCNC: 36 G/DL — SIGNIFICANT CHANGE UP (ref 32–37)
MCV RBC AUTO: 99.5 FL — HIGH (ref 81–99)
METHADONE UR-MCNC: POSITIVE
MONOCYTES # BLD AUTO: 0.35 K/UL — SIGNIFICANT CHANGE UP (ref 0.1–0.6)
MONOCYTES NFR BLD AUTO: 5.7 % — SIGNIFICANT CHANGE UP (ref 1.7–9.3)
NEUTROPHILS # BLD AUTO: 2.86 K/UL — SIGNIFICANT CHANGE UP (ref 1.4–6.5)
NEUTROPHILS NFR BLD AUTO: 46.1 % — SIGNIFICANT CHANGE UP (ref 42.2–75.2)
NITRITE UR-MCNC: NEGATIVE — SIGNIFICANT CHANGE UP
NRBC # BLD: 0 /100 WBCS — SIGNIFICANT CHANGE UP (ref 0–0)
OPIATES UR-MCNC: NEGATIVE — SIGNIFICANT CHANGE UP
PCP UR-MCNC: NEGATIVE — SIGNIFICANT CHANGE UP
PH UR: 6 — SIGNIFICANT CHANGE UP (ref 5–8)
PLATELET # BLD AUTO: 159 K/UL — SIGNIFICANT CHANGE UP (ref 130–400)
POTASSIUM SERPL-MCNC: 3.5 MMOL/L — SIGNIFICANT CHANGE UP (ref 3.5–5)
POTASSIUM SERPL-SCNC: 3.5 MMOL/L — SIGNIFICANT CHANGE UP (ref 3.5–5)
PROPOXYPHENE QUALITATIVE URINE RESULT: NEGATIVE — SIGNIFICANT CHANGE UP
PROT SERPL-MCNC: 7.8 G/DL — SIGNIFICANT CHANGE UP (ref 6–8)
PROT UR-MCNC: 30 MG/DL
RBC # BLD: 3.69 M/UL — LOW (ref 4.2–5.4)
RBC # FLD: 11 % — LOW (ref 11.5–14.5)
RBC CASTS # UR COMP ASSIST: SIGNIFICANT CHANGE UP /HPF
SALICYLATES SERPL-MCNC: <0.3 MG/DL — LOW (ref 4–30)
SODIUM SERPL-SCNC: 140 MMOL/L — SIGNIFICANT CHANGE UP (ref 135–146)
SP GR SPEC: 1.01 — SIGNIFICANT CHANGE UP (ref 1.01–1.03)
THC UR QL: NEGATIVE — SIGNIFICANT CHANGE UP
UROBILINOGEN FLD QL: 0.2 MG/DL — SIGNIFICANT CHANGE UP (ref 0.2–0.2)
WBC # BLD: 6.19 K/UL — SIGNIFICANT CHANGE UP (ref 4.8–10.8)
WBC # FLD AUTO: 6.19 K/UL — SIGNIFICANT CHANGE UP (ref 4.8–10.8)
WBC UR QL: SIGNIFICANT CHANGE UP /HPF

## 2019-10-01 PROCEDURE — 99285 EMERGENCY DEPT VISIT HI MDM: CPT

## 2019-10-01 RX ORDER — FOLIC ACID 0.8 MG
1 TABLET ORAL DAILY
Refills: 0 | Status: DISCONTINUED | OUTPATIENT
Start: 2019-10-01 | End: 2019-10-05

## 2019-10-01 RX ORDER — METHADONE HYDROCHLORIDE 40 MG/1
TABLET ORAL
Refills: 0 | Status: COMPLETED | OUTPATIENT
Start: 2019-10-01 | End: 2019-10-04

## 2019-10-01 RX ORDER — PSEUDOEPHEDRINE HCL 30 MG
60 TABLET ORAL EVERY 6 HOURS
Refills: 0 | Status: DISCONTINUED | OUTPATIENT
Start: 2019-10-01 | End: 2019-10-05

## 2019-10-01 RX ORDER — METHADONE HYDROCHLORIDE 40 MG/1
5 TABLET ORAL EVERY 12 HOURS
Refills: 0 | Status: DISCONTINUED | OUTPATIENT
Start: 2019-10-02 | End: 2019-10-04

## 2019-10-01 RX ORDER — TUBERCULIN PURIFIED PROTEIN DERIVATIVE 5 [IU]/.1ML
5 INJECTION, SOLUTION INTRADERMAL ONCE
Refills: 0 | Status: COMPLETED | OUTPATIENT
Start: 2019-10-01 | End: 2019-10-01

## 2019-10-01 RX ORDER — THIAMINE MONONITRATE (VIT B1) 100 MG
100 TABLET ORAL DAILY
Refills: 0 | Status: COMPLETED | OUTPATIENT
Start: 2019-10-01 | End: 2019-10-03

## 2019-10-01 RX ORDER — ACETAMINOPHEN 500 MG
650 TABLET ORAL EVERY 4 HOURS
Refills: 0 | Status: DISCONTINUED | OUTPATIENT
Start: 2019-10-01 | End: 2019-10-05

## 2019-10-01 RX ORDER — METHADONE HYDROCHLORIDE 40 MG/1
10 TABLET ORAL EVERY 12 HOURS
Refills: 0 | Status: DISCONTINUED | OUTPATIENT
Start: 2019-10-01 | End: 2019-10-02

## 2019-10-01 RX ORDER — MAGNESIUM HYDROXIDE 400 MG/1
30 TABLET, CHEWABLE ORAL ONCE
Refills: 0 | Status: DISCONTINUED | OUTPATIENT
Start: 2019-10-01 | End: 2019-10-05

## 2019-10-01 RX ORDER — IBUPROFEN 200 MG
400 TABLET ORAL EVERY 6 HOURS
Refills: 0 | Status: DISCONTINUED | OUTPATIENT
Start: 2019-10-01 | End: 2019-10-05

## 2019-10-01 RX ORDER — HYDROXYZINE HCL 10 MG
50 TABLET ORAL EVERY 6 HOURS
Refills: 0 | Status: DISCONTINUED | OUTPATIENT
Start: 2019-10-01 | End: 2019-10-05

## 2019-10-01 RX ORDER — SODIUM CHLORIDE 9 MG/ML
2000 INJECTION INTRAMUSCULAR; INTRAVENOUS; SUBCUTANEOUS ONCE
Refills: 0 | Status: COMPLETED | OUTPATIENT
Start: 2019-10-01 | End: 2019-10-01

## 2019-10-01 RX ORDER — HYDROXYZINE HCL 10 MG
100 TABLET ORAL AT BEDTIME
Refills: 0 | Status: DISCONTINUED | OUTPATIENT
Start: 2019-10-01 | End: 2019-10-05

## 2019-10-01 RX ORDER — MULTIVIT-MIN/FERROUS GLUCONATE 9 MG/15 ML
1 LIQUID (ML) ORAL DAILY
Refills: 0 | Status: DISCONTINUED | OUTPATIENT
Start: 2019-10-01 | End: 2019-10-05

## 2019-10-01 RX ORDER — GUAIFENESIN/DEXTROMETHORPHAN 600MG-30MG
5 TABLET, EXTENDED RELEASE 12 HR ORAL EVERY 4 HOURS
Refills: 0 | Status: DISCONTINUED | OUTPATIENT
Start: 2019-10-01 | End: 2019-10-05

## 2019-10-01 RX ORDER — METHOCARBAMOL 500 MG/1
500 TABLET, FILM COATED ORAL EVERY 6 HOURS
Refills: 0 | Status: DISCONTINUED | OUTPATIENT
Start: 2019-10-01 | End: 2019-10-05

## 2019-10-01 RX ORDER — THIAMINE MONONITRATE (VIT B1) 100 MG
100 TABLET ORAL ONCE
Refills: 0 | Status: COMPLETED | OUTPATIENT
Start: 2019-10-01 | End: 2019-10-01

## 2019-10-01 RX ADMIN — Medication 300 MILLIGRAM(S): at 17:28

## 2019-10-01 RX ADMIN — Medication 1 MILLIGRAM(S): at 09:50

## 2019-10-01 RX ADMIN — Medication 30 MILLILITER(S): at 20:42

## 2019-10-01 RX ADMIN — Medication 100 MILLIGRAM(S): at 02:42

## 2019-10-01 RX ADMIN — Medication 100 MILLIGRAM(S): at 09:50

## 2019-10-01 RX ADMIN — Medication 25 MILLIGRAM(S): at 13:13

## 2019-10-01 RX ADMIN — Medication 1 TABLET(S): at 09:50

## 2019-10-01 RX ADMIN — Medication 50 MILLIGRAM(S): at 02:42

## 2019-10-01 RX ADMIN — SODIUM CHLORIDE 2000 MILLILITER(S): 9 INJECTION INTRAMUSCULAR; INTRAVENOUS; SUBCUTANEOUS at 01:59

## 2019-10-01 RX ADMIN — Medication 25 MILLIGRAM(S): at 09:50

## 2019-10-01 RX ADMIN — Medication 25 MILLIGRAM(S): at 17:28

## 2019-10-01 RX ADMIN — METHADONE HYDROCHLORIDE 10 MILLIGRAM(S): 40 TABLET ORAL at 20:42

## 2019-10-01 RX ADMIN — Medication 25 MILLIGRAM(S): at 20:42

## 2019-10-01 NOTE — H&P ADULT - NSHPLABSRESULTS_GEN_ALL_CORE
13.2   6.19  )-----------( 159      ( 01 Oct 2019 01:55 )             36.7   10-01    140  |  100  |  7<L>  ----------------------------<  102<H>  3.5   |  28  |  0.7    Ca    9.3      01 Oct 2019 01:55  Mg     1.6     10-01    TPro  7.8  /  Alb  4.9  /  TBili  0.5  /  DBili  0.2  /  AST  139<H>  /  ALT  27  /  AlkPhos  75  10-01

## 2019-10-01 NOTE — H&P ADULT - HISTORY OF PRESENT ILLNESS
39 y/o female  · HPI Objective Statement:37 yo  female  presents today for ETOH and drug detox. Pt history of prior detox. pt has no current complaints. Pt states she drinks 1 bottle of hard liqour daily and  abuses  cocaine $20 daily ,percocets  15 pills  daily and street  [id number] mg 2x / wk . Pt denies chest pain, shortness of breath, headache, dizziness, nausea/vomiting/diarrhea, head injury, recent trauma. Denies current thoughts of Suicidal and Homicidal Ideations.	    HIV:    HIV Status:  · Offered: Declined	    PAST MEDICAL/SURGICAL/FAMILY/SOCIAL HISTORY:    Past Medical History:  Polysubstance dependence    SVT (supraventricular tachycardia).     Past Surgical History:  H/O prior ablation treatment.     Tobacco Usage:  · Tobacco Usage	Unknown if ever smoked	    · Attestation Comment: I have reviewed and confirmed nurses' notes for patient's medications, allergies, medical history, and surgical history.	    ALLERGIES AND HOME MEDICATIONS:   Allergies:        Allergies:  	codeine: Drug, Rash    Home Medications:   * Patient Currently Takes Medications as of 12-Jul-2019 22:38 documented in Structured Notes  · 	halobetasol 0.05% topical cream: Apply topically to affected area 2 times a day     REVIEW OF SYSTEMS:    Review of Systems:  · Review of Systems: Constitutional: (-) fever  Eyes/ENT: (-) blurry vision, (-) epistaxis  Cardiovascular: (-) chest pain, (-) syncope  Respiratory: (-) cough, (-) shortness of breath  Gastrointestinal: (-) vomiting, (-) diarrhea  Musculoskeletal: (-) neck pain, (-) back pain, (-) joint pain  Integumentary: (-) rash, (-) edema  Neurological: (-) headache, (-) altered mental status  Psychiatric: (-) hallucinations Allergic/Immunologic: (-) pruritus	    PHYSICAL EXAM:   · Physical Examination: Physical Exam   Vital Signs: I have reviewed the initial vital signs.  Constitutional: well-nourished, appears stated age, no acute distress  Eyes: Conjunctiva pink, Sclera clear, PERRLA, EOMI.  Cardiovascular: S1 and S2, regular rate, regular rhythm, well-perfused extremities, radial pulses equal and 2+  Respiratory: unlabored respiratory effort, clear to auscultation bilaterally no wheezing, rales and rhonchi  Gastrointestinal: soft, non-tender abdomen, no pulsatile mass, normal bowl sounds  Musculoskeletal: supple neck, no lower extremity edema, no midline tenderness  Integumentary: warm, dry, no rash  Neurologic: awake, alert, cranial nerves II-XII grossly intact, extremities’ motor and sensory functions grossly intact Psychiatric: appropriate mood, appropriate affect

## 2019-10-01 NOTE — ED ADULT TRIAGE NOTE - CHIEF COMPLAINT QUOTE
I need detox from alcohol, methadone, cocaine.  I drink 750cc day of alcohol.  Last time I tried to detox I went into dt's.  I use liquid methadone and 10 percocet per day.  I use cocaine on occasion.

## 2019-10-01 NOTE — ED PROVIDER NOTE - CLINICAL SUMMARY MEDICAL DECISION MAKING FREE TEXT BOX
Pt pw ETOH use. No Si No Hi. No signs of trauma no active withdrawal  pt medically cleared for detox admit

## 2019-10-01 NOTE — H&P ADULT - ATTENDING COMMENTS
Patient interviewed and examined.    Chart reviewed.    PA's H&P noted and modified, as appropriate.    Case discussed on team rounds    Following is my summary of the case.    Admitted for detox: from __x__ED, ___Intake, ____Med/Surg Floor    Alcohol__x__   Opioid_____  Benzo___ Other_____    Substance amount, duration of use, last usage, and prior attempts at detox or rehabs, are outlined above in the H&P and discussed with patient.    Associated withdrawal symptoms presents.  Comorbid conditions noted. Chronic and Stable.    Past Medical Hx, Psych Hx, family Hx, Social Hx from H&P reviewed and NO changes.    Old medical record and medication Hx. Reviewed    Following items reviewed and addressed:  1. labs  2. EKG  3. Imaging from PACs module    Examination: no change from PA's exam.    Place on following protocol  _____Medically Managed  __X__Medically Supervised    Ciwa_____Librium taper_x___Ativan taper___Methadone taper___ Phenobarb taper____ Suboxone Induction____MMTP____    Narcan Kit Offered    Psych Consult __X__N/A  ___Ordered    Physical Therapy  ___X n/a   ___  Ordered    Aftercare disposition to be addressed by counselors.    Estimated length of stay 3-5 days.

## 2019-10-01 NOTE — ED PROVIDER NOTE - OBJECTIVE STATEMENT
Pt presents today for ETOH and drug detox. Pt history of prior detox. pt has no current complaints. Pt states she drinks 1 bottle of hard liqour, cocaine, methadone. Pt denies chest pain, shortness of breath, headache, dizziness, nausea/vomiting/diarrhea, head injury, recent trauma. Denies current thoughts of Suicidal and Homicidal Ideations.

## 2019-10-02 RX ORDER — KETOCONAZOLE 20 MG/G
1 AEROSOL, FOAM TOPICAL
Refills: 0 | Status: DISCONTINUED | OUTPATIENT
Start: 2019-10-02 | End: 2019-10-05

## 2019-10-02 RX ADMIN — METHADONE HYDROCHLORIDE 10 MILLIGRAM(S): 40 TABLET ORAL at 09:20

## 2019-10-02 RX ADMIN — METHADONE HYDROCHLORIDE 5 MILLIGRAM(S): 40 TABLET ORAL at 20:42

## 2019-10-02 RX ADMIN — Medication 20 MILLIGRAM(S): at 17:42

## 2019-10-02 RX ADMIN — Medication 1 APPLICATION(S): at 20:42

## 2019-10-02 RX ADMIN — Medication 1 APPLICATION(S): at 09:18

## 2019-10-02 RX ADMIN — KETOCONAZOLE 1 APPLICATION(S): 20 AEROSOL, FOAM TOPICAL at 17:42

## 2019-10-02 RX ADMIN — Medication 100 MILLIGRAM(S): at 09:20

## 2019-10-02 RX ADMIN — Medication 20 MILLIGRAM(S): at 13:13

## 2019-10-02 RX ADMIN — Medication 1 MILLIGRAM(S): at 09:20

## 2019-10-02 RX ADMIN — Medication 20 MILLIGRAM(S): at 20:42

## 2019-10-02 RX ADMIN — Medication 25 MILLIGRAM(S): at 06:51

## 2019-10-02 RX ADMIN — Medication 1 TABLET(S): at 09:20

## 2019-10-02 RX ADMIN — Medication 20 MILLIGRAM(S): at 09:20

## 2019-10-03 RX ORDER — MAGNESIUM OXIDE 400 MG ORAL TABLET 241.3 MG
400 TABLET ORAL
Refills: 0 | Status: COMPLETED | OUTPATIENT
Start: 2019-10-03 | End: 2019-10-05

## 2019-10-03 RX ADMIN — MAGNESIUM OXIDE 400 MG ORAL TABLET 400 MILLIGRAM(S): 241.3 TABLET ORAL at 17:26

## 2019-10-03 RX ADMIN — MAGNESIUM OXIDE 400 MG ORAL TABLET 400 MILLIGRAM(S): 241.3 TABLET ORAL at 11:16

## 2019-10-03 RX ADMIN — METHADONE HYDROCHLORIDE 5 MILLIGRAM(S): 40 TABLET ORAL at 21:07

## 2019-10-03 RX ADMIN — Medication 1 TABLET(S): at 09:14

## 2019-10-03 RX ADMIN — Medication 15 MILLIGRAM(S): at 09:14

## 2019-10-03 RX ADMIN — Medication 100 MILLIGRAM(S): at 09:14

## 2019-10-03 RX ADMIN — Medication 15 MILLIGRAM(S): at 13:11

## 2019-10-03 RX ADMIN — MAGNESIUM OXIDE 400 MG ORAL TABLET 400 MILLIGRAM(S): 241.3 TABLET ORAL at 12:47

## 2019-10-03 RX ADMIN — Medication 20 MILLIGRAM(S): at 06:30

## 2019-10-03 RX ADMIN — METHADONE HYDROCHLORIDE 5 MILLIGRAM(S): 40 TABLET ORAL at 09:14

## 2019-10-03 RX ADMIN — Medication 1 APPLICATION(S): at 09:14

## 2019-10-03 RX ADMIN — Medication 15 MILLIGRAM(S): at 21:07

## 2019-10-03 RX ADMIN — Medication 1 MILLIGRAM(S): at 09:14

## 2019-10-03 RX ADMIN — Medication 15 MILLIGRAM(S): at 17:26

## 2019-10-03 NOTE — CHART NOTE - NSCHARTNOTEFT_GEN_A_CORE
Subsequent Inpatient Encounter                                       Detox Unit    GABRIELA MENDEZ   38y   Female      Chief Complaint:    Follow up for Alcohol  Dependency    HPI:     I reviewed previous notes. No Change, except if noted below.             Detail:_    ROS:   I reviewed with patient.  No changes from previous notes except if noted below.             Detail: _    PFSH I reviewed with patient. No changes from previous notes except if noted below.             Detail_    Medication reconciliation performed.    MEDICATIONS  (STANDING):  chlordiazePOXIDE 15 milliGRAM(s) Oral every 4 hours  chlordiazePOXIDE   Oral   clobetasol 0.05% Cream 1 Application(s) Topical every 12 hours  folic acid 1 milliGRAM(s) Oral daily  ketoconazole 2% Shampoo 1 Application(s) Topical <User Schedule>  methadone    Tablet   Oral   methadone    Tablet 5 milliGRAM(s) Oral every 12 hours  multivitamin/minerals 1 Tablet(s) Oral daily  thiamine 100 milliGRAM(s) Oral daily      MEDICATIONS  (PRN):  acetaminophen   Tablet .. 650 milliGRAM(s) Oral every 4 hours PRN Temp greater or equal to 38.5C (101.3F), Moderate Pain (4 - 6)  aluminum hydroxide/magnesium hydroxide/simethicone Suspension 30 milliLiter(s) Oral every 6 hours PRN Heartburn  bismuth subsalicylate Liquid 30 milliLiter(s) Oral every 6 hours PRN Diarrhea  chlordiazePOXIDE 25 milliGRAM(s) Oral every 4 hours PRN Withdrawal  cloNIDine 0.1 milliGRAM(s) Oral every 8 hours PRN Blood Pressure GREATER THAN 140/90 mmHG  cloNIDine 0.1 milliGRAM(s) Oral every 8 hours PRN opiate withdrawal  guaiFENesin/dextromethorphan  Syrup 5 milliLiter(s) Oral every 4 hours PRN Cough  hydrOXYzine hydrochloride 50 milliGRAM(s) Oral every 6 hours PRN Anxiety  hydrOXYzine hydrochloride 100 milliGRAM(s) Oral at bedtime PRN insomnia  ibuprofen  Tablet. 400 milliGRAM(s) Oral every 6 hours PRN Mild Pain (1 - 3)  magnesium hydroxide Suspension 30 milliLiter(s) Oral once PRN Constipation  methocarbamol 500 milliGRAM(s) Oral every 6 hours PRN muscle pain  pseudoephedrine 60 milliGRAM(s) Oral every 6 hours PRN Rhinitis  trimethobenzamide 300 milliGRAM(s) Oral every 6 hours PRN Nausea and/or Vomiting  trimethobenzamide Injectable 200 milliGRAM(s) IntraMuscular every 6 hours PRN Nausea and/or Vomiting      T(C): 36.2 (10-03-19 @ 06:00), Max: 37.2 (10-02-19 @ 12:21)  HR: 80 (10-03-19 @ 06:00) (76 - 91)  BP: 107/67 (10-03-19 @ 06:00) (101/57 - 132/68)  RR: 16 (10-03-19 @ 06:00) (14 - 17)  SpO2: --    PHYSICAL EXAM:      Constitutional: NAD, A&O x3    Eyes: PERRLA, no conjuctivitis    Neck: no lymphadenopathy    Respiratory: +air entry, no rales, no rhonchi, no wheezes    Cardiovascular: +S1 and S2, regular rate and rhythm    Gastrointestinal: +BS, soft, non-tender, not distended    Extremities:  no edema, no calf tenderness    Skin: no rashes, normal turgor            Magnesium, Serum: 1.6 mg/dL (10-01-19 @ 01:55)        Drug Screen 1, Urine Result: Done (10-01-19 @ 02:45)        Impression and Plan:    Primary Diagnosis:  Alcohol Dependency                                Medication: Librium Protocol/ CIWA Protocol    Secondary Diagnosis:  Dermatitis                                                                Medication: on meds    Tertiary Diagnosis:                                                                       Medication      Continue Detox Protocols. Use of PRNS as needed for withdrawal and comfort.    Adjustments to protocols:    Labs/ Tests reviewed.    Tests ordered:     Likely Disposition: __X_Home       ___Rehab       ___Outpatient Program    ___Self Help     _____Other    Estimated Length of stay:_4___

## 2019-10-04 RX ADMIN — Medication 1 MILLIGRAM(S): at 08:40

## 2019-10-04 RX ADMIN — Medication 1 TABLET(S): at 08:40

## 2019-10-04 RX ADMIN — Medication 10 MILLIGRAM(S): at 13:08

## 2019-10-04 RX ADMIN — Medication 15 MILLIGRAM(S): at 05:57

## 2019-10-04 RX ADMIN — METHADONE HYDROCHLORIDE 5 MILLIGRAM(S): 40 TABLET ORAL at 08:40

## 2019-10-04 RX ADMIN — MAGNESIUM OXIDE 400 MG ORAL TABLET 400 MILLIGRAM(S): 241.3 TABLET ORAL at 08:40

## 2019-10-04 RX ADMIN — MAGNESIUM OXIDE 400 MG ORAL TABLET 400 MILLIGRAM(S): 241.3 TABLET ORAL at 11:09

## 2019-10-04 RX ADMIN — Medication 10 MILLIGRAM(S): at 17:45

## 2019-10-04 RX ADMIN — MAGNESIUM OXIDE 400 MG ORAL TABLET 400 MILLIGRAM(S): 241.3 TABLET ORAL at 15:32

## 2019-10-04 RX ADMIN — Medication 10 MILLIGRAM(S): at 09:03

## 2019-10-04 RX ADMIN — Medication 10 MILLIGRAM(S): at 21:28

## 2019-10-04 RX ADMIN — Medication 30 MILLILITER(S): at 12:02

## 2019-10-04 RX ADMIN — Medication 1 APPLICATION(S): at 08:40

## 2019-10-04 NOTE — CHART NOTE - NSCHARTNOTEFT_GEN_A_CORE
discharge note      Allergies:  codeine (Rash)      Diet: Regular    Activity: as tolerated    Follow up with    1. PMD in 2 weeks    2. Psych in 2 weeks    3.    Follow up for abnormal labs/tests    1.    Extra Instructions:      Flu Vaccine given  Yes_____         No______      Diagnosis:  Chemical Dependency   Maintain sobriety  refrain from all use      Patient Signature___________________________________________  Date_________________      Nurse Signature_____________________________________________Date_________________

## 2019-10-05 VITALS
RESPIRATION RATE: 14 BRPM | SYSTOLIC BLOOD PRESSURE: 114 MMHG | TEMPERATURE: 97 F | HEART RATE: 72 BPM | DIASTOLIC BLOOD PRESSURE: 69 MMHG

## 2019-10-05 RX ADMIN — Medication 1 MILLIGRAM(S): at 09:40

## 2019-10-05 RX ADMIN — MAGNESIUM OXIDE 400 MG ORAL TABLET 400 MILLIGRAM(S): 241.3 TABLET ORAL at 09:40

## 2019-10-05 RX ADMIN — Medication 10 MILLIGRAM(S): at 06:28

## 2019-10-05 RX ADMIN — Medication 1 TABLET(S): at 09:40
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
## 2019-10-11 DIAGNOSIS — F19.20 OTHER PSYCHOACTIVE SUBSTANCE DEPENDENCE, UNCOMPLICATED: ICD-10-CM

## 2019-10-11 DIAGNOSIS — I47.1 SUPRAVENTRICULAR TACHYCARDIA: ICD-10-CM

## 2019-10-11 DIAGNOSIS — F10.10 ALCOHOL ABUSE, UNCOMPLICATED: ICD-10-CM

## 2019-12-01 PROCEDURE — G9005: CPT

## 2019-12-20 ENCOUNTER — INPATIENT (INPATIENT)
Facility: HOSPITAL | Age: 39
LOS: 4 days | Discharge: HOME | End: 2019-12-25
Attending: STUDENT IN AN ORGANIZED HEALTH CARE EDUCATION/TRAINING PROGRAM | Admitting: STUDENT IN AN ORGANIZED HEALTH CARE EDUCATION/TRAINING PROGRAM
Payer: MEDICAID

## 2019-12-20 VITALS
RESPIRATION RATE: 18 BRPM | TEMPERATURE: 98 F | HEART RATE: 161 BPM | DIASTOLIC BLOOD PRESSURE: 91 MMHG | WEIGHT: 139.99 LBS | SYSTOLIC BLOOD PRESSURE: 159 MMHG | OXYGEN SATURATION: 99 %

## 2019-12-20 DIAGNOSIS — Z98.890 OTHER SPECIFIED POSTPROCEDURAL STATES: Chronic | ICD-10-CM

## 2019-12-20 LAB
ALBUMIN SERPL ELPH-MCNC: 5 G/DL — SIGNIFICANT CHANGE UP (ref 3.5–5.2)
ALP SERPL-CCNC: 67 U/L — SIGNIFICANT CHANGE UP (ref 30–115)
ALT FLD-CCNC: 14 U/L — SIGNIFICANT CHANGE UP (ref 0–41)
ANION GAP SERPL CALC-SCNC: 18 MMOL/L — HIGH (ref 7–14)
AST SERPL-CCNC: 33 U/L — SIGNIFICANT CHANGE UP (ref 0–41)
BASOPHILS # BLD AUTO: 0.04 K/UL — SIGNIFICANT CHANGE UP (ref 0–0.2)
BASOPHILS NFR BLD AUTO: 0.4 % — SIGNIFICANT CHANGE UP (ref 0–1)
BILIRUB SERPL-MCNC: 0.2 MG/DL — SIGNIFICANT CHANGE UP (ref 0.2–1.2)
BUN SERPL-MCNC: 15 MG/DL — SIGNIFICANT CHANGE UP (ref 10–20)
CALCIUM SERPL-MCNC: 9.6 MG/DL — SIGNIFICANT CHANGE UP (ref 8.5–10.1)
CHLORIDE SERPL-SCNC: 98 MMOL/L — SIGNIFICANT CHANGE UP (ref 98–110)
CO2 SERPL-SCNC: 25 MMOL/L — SIGNIFICANT CHANGE UP (ref 17–32)
CREAT SERPL-MCNC: 0.7 MG/DL — SIGNIFICANT CHANGE UP (ref 0.7–1.5)
D DIMER BLD IA.RAPID-MCNC: 77 NG/ML DDU — SIGNIFICANT CHANGE UP (ref 0–230)
EOSINOPHIL # BLD AUTO: 0.02 K/UL — SIGNIFICANT CHANGE UP (ref 0–0.7)
EOSINOPHIL NFR BLD AUTO: 0.2 % — SIGNIFICANT CHANGE UP (ref 0–8)
ETHANOL SERPL-MCNC: 245 MG/DL — HIGH
GLUCOSE SERPL-MCNC: 120 MG/DL — HIGH (ref 70–99)
HCG SERPL QL: NEGATIVE — SIGNIFICANT CHANGE UP
HCT VFR BLD CALC: 38.7 % — SIGNIFICANT CHANGE UP (ref 37–47)
HGB BLD-MCNC: 13.4 G/DL — SIGNIFICANT CHANGE UP (ref 12–16)
IMM GRANULOCYTES NFR BLD AUTO: 0.4 % — HIGH (ref 0.1–0.3)
LYMPHOCYTES # BLD AUTO: 2.35 K/UL — SIGNIFICANT CHANGE UP (ref 1.2–3.4)
LYMPHOCYTES # BLD AUTO: 24.8 % — SIGNIFICANT CHANGE UP (ref 20.5–51.1)
MCHC RBC-ENTMCNC: 34.4 PG — HIGH (ref 27–31)
MCHC RBC-ENTMCNC: 34.6 G/DL — SIGNIFICANT CHANGE UP (ref 32–37)
MCV RBC AUTO: 99.5 FL — HIGH (ref 81–99)
MONOCYTES # BLD AUTO: 0.36 K/UL — SIGNIFICANT CHANGE UP (ref 0.1–0.6)
MONOCYTES NFR BLD AUTO: 3.8 % — SIGNIFICANT CHANGE UP (ref 1.7–9.3)
NEUTROPHILS # BLD AUTO: 6.66 K/UL — HIGH (ref 1.4–6.5)
NEUTROPHILS NFR BLD AUTO: 70.4 % — SIGNIFICANT CHANGE UP (ref 42.2–75.2)
NRBC # BLD: 0 /100 WBCS — SIGNIFICANT CHANGE UP (ref 0–0)
PLATELET # BLD AUTO: 199 K/UL — SIGNIFICANT CHANGE UP (ref 130–400)
POTASSIUM SERPL-MCNC: 3.6 MMOL/L — SIGNIFICANT CHANGE UP (ref 3.5–5)
POTASSIUM SERPL-SCNC: 3.6 MMOL/L — SIGNIFICANT CHANGE UP (ref 3.5–5)
PROT SERPL-MCNC: 7.9 G/DL — SIGNIFICANT CHANGE UP (ref 6–8)
RBC # BLD: 3.89 M/UL — LOW (ref 4.2–5.4)
RBC # FLD: 13.4 % — SIGNIFICANT CHANGE UP (ref 11.5–14.5)
SODIUM SERPL-SCNC: 141 MMOL/L — SIGNIFICANT CHANGE UP (ref 135–146)
TROPONIN T SERPL-MCNC: <0.01 NG/ML — SIGNIFICANT CHANGE UP
WBC # BLD: 9.47 K/UL — SIGNIFICANT CHANGE UP (ref 4.8–10.8)
WBC # FLD AUTO: 9.47 K/UL — SIGNIFICANT CHANGE UP (ref 4.8–10.8)

## 2019-12-20 PROCEDURE — 93010 ELECTROCARDIOGRAM REPORT: CPT

## 2019-12-20 PROCEDURE — 99285 EMERGENCY DEPT VISIT HI MDM: CPT

## 2019-12-20 PROCEDURE — 71045 X-RAY EXAM CHEST 1 VIEW: CPT | Mod: 26

## 2019-12-20 RX ORDER — THIAMINE MONONITRATE (VIT B1) 100 MG
100 TABLET ORAL ONCE
Refills: 0 | Status: COMPLETED | OUTPATIENT
Start: 2019-12-20 | End: 2019-12-20

## 2019-12-20 RX ORDER — SODIUM CHLORIDE 9 MG/ML
2000 INJECTION INTRAMUSCULAR; INTRAVENOUS; SUBCUTANEOUS ONCE
Refills: 0 | Status: COMPLETED | OUTPATIENT
Start: 2019-12-20 | End: 2019-12-20

## 2019-12-20 RX ADMIN — Medication 50 MILLIGRAM(S): at 23:33

## 2019-12-20 RX ADMIN — SODIUM CHLORIDE 2000 MILLILITER(S): 9 INJECTION INTRAMUSCULAR; INTRAVENOUS; SUBCUTANEOUS at 22:58

## 2019-12-20 RX ADMIN — Medication 100 MILLIGRAM(S): at 23:33

## 2019-12-20 NOTE — ED ADULT NURSE NOTE - CHIEF COMPLAINT QUOTE
Patient sent here by PMD for tachycardia and palpitations. Hx of 4 ablations and alcoholic cirrhosis. Admits to having a couple drinks today.

## 2019-12-20 NOTE — ED ADULT TRIAGE NOTE - CHIEF COMPLAINT QUOTE
Patient sent here by PMD for tachycardia and palpitations. Hx of 4 ablations. Patient sent here by PMD for tachycardia and palpitations. Hx of 4 ablations and alcoholic cirrhosis. Admits to having a couple drinks today.

## 2019-12-20 NOTE — ED ADULT NURSE NOTE - OBJECTIVE STATEMENT
pt sent in by pmd for palpitations and chest pain. pt reports daily alcohol abuse, reports having 3 large drinks prior to arrival. pt reports history of SVT, pt reports midsternal chest pain 5/5 aching, without radiation. pt denies headache or shortness of breath.

## 2019-12-20 NOTE — ED ADULT NURSE NOTE - NSIMPLEMENTINTERV_GEN_ALL_ED
Implemented All Universal Safety Interventions:  [place] to call system. Call bell, personal items and telephone within reach. Instruct patient to call for assistance. Room bathroom lighting operational. Non-slip footwear when patient is off stretcher. Physically safe environment: no spills, clutter or unnecessary equipment. Stretcher in lowest position, wheels locked, appropriate side rails in place.

## 2019-12-21 LAB
ALBUMIN SERPL ELPH-MCNC: 3.9 G/DL — SIGNIFICANT CHANGE UP (ref 3.5–5.2)
ALP SERPL-CCNC: 54 U/L — SIGNIFICANT CHANGE UP (ref 30–115)
ALT FLD-CCNC: 11 U/L — SIGNIFICANT CHANGE UP (ref 0–41)
ANION GAP SERPL CALC-SCNC: 13 MMOL/L — SIGNIFICANT CHANGE UP (ref 7–14)
AST SERPL-CCNC: 26 U/L — SIGNIFICANT CHANGE UP (ref 0–41)
BASOPHILS # BLD AUTO: 0.03 K/UL — SIGNIFICANT CHANGE UP (ref 0–0.2)
BASOPHILS NFR BLD AUTO: 0.6 % — SIGNIFICANT CHANGE UP (ref 0–1)
BILIRUB DIRECT SERPL-MCNC: <0.2 MG/DL — SIGNIFICANT CHANGE UP (ref 0–0.2)
BILIRUB INDIRECT FLD-MCNC: >0.1 MG/DL — LOW (ref 0.2–1.2)
BILIRUB SERPL-MCNC: 0.3 MG/DL — SIGNIFICANT CHANGE UP (ref 0.2–1.2)
BUN SERPL-MCNC: 11 MG/DL — SIGNIFICANT CHANGE UP (ref 10–20)
CALCIUM SERPL-MCNC: 8.5 MG/DL — SIGNIFICANT CHANGE UP (ref 8.5–10.1)
CHLORIDE SERPL-SCNC: 102 MMOL/L — SIGNIFICANT CHANGE UP (ref 98–110)
CK MB CFR SERPL CALC: 1.2 NG/ML — SIGNIFICANT CHANGE UP (ref 0.6–6.3)
CO2 SERPL-SCNC: 26 MMOL/L — SIGNIFICANT CHANGE UP (ref 17–32)
CORTIS AM PEAK SERPL-MCNC: 0.8 UG/DL — LOW (ref 6–18.4)
CREAT SERPL-MCNC: 0.6 MG/DL — LOW (ref 0.7–1.5)
EOSINOPHIL # BLD AUTO: 0.03 K/UL — SIGNIFICANT CHANGE UP (ref 0–0.7)
EOSINOPHIL NFR BLD AUTO: 0.6 % — SIGNIFICANT CHANGE UP (ref 0–8)
GLUCOSE SERPL-MCNC: 84 MG/DL — SIGNIFICANT CHANGE UP (ref 70–99)
HCT VFR BLD CALC: 32.2 % — LOW (ref 37–47)
HGB BLD-MCNC: 10.7 G/DL — LOW (ref 12–16)
IMM GRANULOCYTES NFR BLD AUTO: 0.2 % — SIGNIFICANT CHANGE UP (ref 0.1–0.3)
LYMPHOCYTES # BLD AUTO: 1.77 K/UL — SIGNIFICANT CHANGE UP (ref 1.2–3.4)
LYMPHOCYTES # BLD AUTO: 35.3 % — SIGNIFICANT CHANGE UP (ref 20.5–51.1)
MAGNESIUM SERPL-MCNC: 2.2 MG/DL — SIGNIFICANT CHANGE UP (ref 1.8–2.4)
MCHC RBC-ENTMCNC: 33.2 G/DL — SIGNIFICANT CHANGE UP (ref 32–37)
MCHC RBC-ENTMCNC: 33.9 PG — HIGH (ref 27–31)
MCV RBC AUTO: 101.9 FL — HIGH (ref 81–99)
MONOCYTES # BLD AUTO: 0.4 K/UL — SIGNIFICANT CHANGE UP (ref 0.1–0.6)
MONOCYTES NFR BLD AUTO: 8 % — SIGNIFICANT CHANGE UP (ref 1.7–9.3)
NEUTROPHILS # BLD AUTO: 2.78 K/UL — SIGNIFICANT CHANGE UP (ref 1.4–6.5)
NEUTROPHILS NFR BLD AUTO: 55.3 % — SIGNIFICANT CHANGE UP (ref 42.2–75.2)
NRBC # BLD: 0 /100 WBCS — SIGNIFICANT CHANGE UP (ref 0–0)
PLATELET # BLD AUTO: 157 K/UL — SIGNIFICANT CHANGE UP (ref 130–400)
POTASSIUM SERPL-MCNC: 4 MMOL/L — SIGNIFICANT CHANGE UP (ref 3.5–5)
POTASSIUM SERPL-SCNC: 4 MMOL/L — SIGNIFICANT CHANGE UP (ref 3.5–5)
PROT SERPL-MCNC: 6.1 G/DL — SIGNIFICANT CHANGE UP (ref 6–8)
RBC # BLD: 3.16 M/UL — LOW (ref 4.2–5.4)
RBC # FLD: 13.5 % — SIGNIFICANT CHANGE UP (ref 11.5–14.5)
SODIUM SERPL-SCNC: 141 MMOL/L — SIGNIFICANT CHANGE UP (ref 135–146)
TROPONIN T SERPL-MCNC: <0.01 NG/ML — SIGNIFICANT CHANGE UP
TSH SERPL-MCNC: 0.65 UIU/ML — SIGNIFICANT CHANGE UP (ref 0.27–4.2)
WBC # BLD: 5.02 K/UL — SIGNIFICANT CHANGE UP (ref 4.8–10.8)
WBC # FLD AUTO: 5.02 K/UL — SIGNIFICANT CHANGE UP (ref 4.8–10.8)

## 2019-12-21 PROCEDURE — 93970 EXTREMITY STUDY: CPT | Mod: 26

## 2019-12-21 PROCEDURE — 99223 1ST HOSP IP/OBS HIGH 75: CPT

## 2019-12-21 PROCEDURE — 93010 ELECTROCARDIOGRAM REPORT: CPT

## 2019-12-21 PROCEDURE — 99222 1ST HOSP IP/OBS MODERATE 55: CPT

## 2019-12-21 RX ORDER — ENOXAPARIN SODIUM 100 MG/ML
40 INJECTION SUBCUTANEOUS EVERY 24 HOURS
Refills: 0 | Status: DISCONTINUED | OUTPATIENT
Start: 2019-12-21 | End: 2019-12-25

## 2019-12-21 RX ORDER — THIAMINE MONONITRATE (VIT B1) 100 MG
100 TABLET ORAL DAILY
Refills: 0 | Status: DISCONTINUED | OUTPATIENT
Start: 2019-12-21 | End: 2019-12-25

## 2019-12-21 RX ORDER — FOLIC ACID 0.8 MG
1 TABLET ORAL DAILY
Refills: 0 | Status: DISCONTINUED | OUTPATIENT
Start: 2019-12-21 | End: 2019-12-25

## 2019-12-21 RX ORDER — MAGNESIUM SULFATE 500 MG/ML
2 VIAL (ML) INJECTION ONCE
Refills: 0 | Status: COMPLETED | OUTPATIENT
Start: 2019-12-21 | End: 2019-12-21

## 2019-12-21 RX ADMIN — Medication 100 MILLIGRAM(S): at 11:28

## 2019-12-21 RX ADMIN — Medication 2 MILLIGRAM(S): at 22:34

## 2019-12-21 RX ADMIN — Medication 2 MILLIGRAM(S): at 22:14

## 2019-12-21 RX ADMIN — Medication 1 MILLIGRAM(S): at 16:47

## 2019-12-21 RX ADMIN — Medication 1 MILLIGRAM(S): at 11:28

## 2019-12-21 RX ADMIN — Medication 50 GRAM(S): at 04:20

## 2019-12-21 RX ADMIN — Medication 2 MILLIGRAM(S): at 07:24

## 2019-12-21 RX ADMIN — Medication 1 MILLIGRAM(S): at 12:25

## 2019-12-21 NOTE — H&P ADULT - NSHPPHYSICALEXAM_GEN_ALL_CORE
PHYSICAL EXAM:  GENERAL: puffy face, not in distress  HEAD:  Atraumatic, Normocephalic  EYES: EOMI, PERRLA, conjunctiva and sclera clear  NECK: Supple, No JVD  CHEST/LUNG: Clear to auscultation bilaterally; No wheeze  HEART: Regular rate and rhythm; No murmurs, rubs, or gallops  ABDOMEN: Soft, Nontender, Nondistended; Bowel sounds present, bruise on the flank region, mildly tender on palpation  EXTREMITIES:  2+ Peripheral Pulses, No clubbing, cyanosis, or edema  PSYCH: in alcohol intoxication  NEUROLOGY: non-focal  SKIN: No rashes or lesions

## 2019-12-21 NOTE — H&P ADULT - ATTENDING COMMENTS
I saw and examined the patient independently. I agree with above history, physical exam and plan of care which I have reviewed and edited where appropriate with following additions.     patient looks drowsy , reports last ETOH intake yesterday, looked tremolos.     sinus tachycardia likely due to ETOH/polysubstance abuse withdrawal with h/o SVT sp failure of ablation x4:   telemonitoring.   HR still fast 118's , sinus.   EP eval appreciated - suggest no further lewis except for TTE.   fu TTE.   cw ativan protocol for ETOH withdrawal.     ETOH withdrawal:   c/w CiWA protocol with ativan   fu addiction consult.   avoid BB with recent cocaine abuse.     suspected thiamine/folate def:   c/w thiamin / folate supplements.    suspected mag def:   replete prn to keep mag >2.     dvt ppx

## 2019-12-21 NOTE — CONSULT NOTE ADULT - SUBJECTIVE AND OBJECTIVE BOX
HPI:  38 yo F with PMH of SVT s/p 4 failed ablations, scoliosis, alcoholism w/ prior withdrawal symptoms (DTs), and Multiple drug abuse presenting to ED with tachycardia up to 160's found at her primary doctor office.  Patient was sent by PCP Dr. Marcano due to concern for tachycardia.  Patient also states she has had R foot numbness/tingling x 2-3 weeks (no injuries/trauma, no focal deficits, walking without difficulty). Denies any vision changes, Headache, neck pain, back pain, Chest pain, SOB, abd pain, nausea/vomiting/diarrhea, rashes, or injuries/traumas/falls.   Ablation was done in [place] 2 times and [place] 2 times and failed and was told that she may need PPM. She has been to detox twice in the past. Last used cocaine 2 days ago, snorts, no IVDU or additional drug use. Last drink was a few hours prior to arrival to ED, drinks vodka daily, up to 1L. Has been drinking daily since 05/19. Primary doctor was concerned about possible clot although patient has no history of DVT/PE, no calf swelling, no shortness of breath, and no recent surgeries/immobilizations.    She also reports that she has noticed swelling in her face for last 2 weeks.  She also has bruise in her left flank she she got when she accidentally hit by car door while closing it. (21 Dec 2019 04:34)      Patient is a 39y old  Female who presents with a chief complaint of Palpitation (21 Dec 2019 04:34). EP consulted for tachycardia.    ROS:   All negative except as noted in HPI.    PAST MEDICAL & SURGICAL HISTORY:  Polysubstance dependence  SVT (supraventricular tachycardia)  H/O prior ablation treatment    MEDICATIONS  (STANDING):  enoxaparin Injectable 40 milliGRAM(s) SubCutaneous every 24 hours  folic acid 1 milliGRAM(s) Oral daily  thiamine 100 milliGRAM(s) Oral daily    MEDICATIONS  (PRN):  LORazepam   Injectable 2 milliGRAM(s) IV Push every 1 hour PRN Symptom-triggered: each CIWA -Ar score 8 or GREATER   Home Medications:      FAMILY HISTORY:      SOCIAL HISTORY:  Drinks ~ 1 L vodka daily ; current cocaine use (snorts)      Vital Signs Last 24 Hrs  T(C): 36.3 (21 Dec 2019 08:19), Max: 36.7 (20 Dec 2019 21:25)  T(F): 97.3 (21 Dec 2019 08:19), Max: 98.1 (20 Dec 2019 21:25)  HR: 91 (21 Dec 2019 08:19) (91 - 161)  BP: 115/68 (21 Dec 2019 08:19) (115/68 - 159/91)  BP(mean): --  RR: 18 (21 Dec 2019 08:19) (18 - 20)  SpO2: 100% (21 Dec 2019 08:19) (99% - 100%)      INTERPRETATION OF TELEMETRY: SR 88 BPM    ECG:  < from: 12 Lead ECG (12.21.19 @ 06:13) >  Ventricular Rate 95 BPM    Atrial Rate 95 BPM    P-R Interval 146 ms    QRS Duration 80 ms    Q-T Interval 372 ms    QTC Calculation(Bezet) 467 ms    P Axis 51 degrees    R Axis 0 degrees    T Axis 50 degrees    Diagnosis Line Normal sinus rhythm  Low voltage QRS  Borderline ECG    Confirmed by FAUZIA KING MD ([id number]) on 12/21/2019 7:22:14 AM    < end of copied text >      LABS:                        13.4   9.47  )-----------( 199      ( 20 Dec 2019 21:55 )             38.7     12-20    141  |  98  |  15  ----------------------------<  120<H>  3.6   |  25  |  0.7    Ca    9.6      20 Dec 2019 21:55    TPro  7.9  /  Alb  5.0  /  TBili  0.2  /  DBili  x   /  AST  33  /  ALT  14  /  AlkPhos  67  12-20    CARDIAC MARKERS ( 20 Dec 2019 21:55 )  x     / <0.01 ng/mL / x     / x     / x            LIVER FUNCTIONS - ( 20 Dec 2019 21:55 )  Alb: 5.0 g/dL / Pro: 7.9 g/dL / ALK PHOS: 67 U/L / ALT: 14 U/L / AST: 33 U/L / GGT: x           RADIOLOGY & ADDITIONAL STUDIES:

## 2019-12-21 NOTE — H&P ADULT - HISTORY OF PRESENT ILLNESS
40 yo F with PMH of SVT s/p 4 failed ablations, scoliosis, alcoholism w/ prior withdrawal symptoms (DTs), and Multiple drug abuse presenting to ED with tachycardia up to 160's found at her primary doctor office.  Patient was sent by PCP Dr. Marcano due to concern for tachycardia.  Patient also states she has had R foot numbness/tingling x 2-3 weeks (no injuries/trauma, no focal deficits, walking without difficulty). Denies any vision changes, Headache, neck pain, back pain, Chest pain, SOB, abd pain, nausea/vomiting/diarrhea, rashes, or injuries/traumas/falls.   Ablation was done in [place] 2 times and [place] 2 times and failed and was told that she may need PPM. She has been to detox twice in the past. Last used cocaine 2 days ago, snorts, no IVDU or additional drug use. Last drink was a few hours prior to arrival to ED, drinks vodka daily, up to 1L. Has been drinking daily since 05/19. Primary doctor was concerned about possible clot although patient has no history of DVT/PE, no calf swelling, no shortness of breath, and no recent surgeries/immobilizations.    She also reports that she has noticed swelling in her face for last 2 weeks  She also has bruise in her left flank she she got when she accidentally hit by car door while closing it.

## 2019-12-21 NOTE — ED PROVIDER NOTE - CARE PLAN
Principal Discharge DX:	Tachycardia  Secondary Diagnosis:	Alcoholism  Secondary Diagnosis:	History of cocaine abuse

## 2019-12-21 NOTE — H&P ADULT - ASSESSMENT
Palpitation:  SVT failed 4 ablation  EKG obtained in the ED showed sinus tachycardia  patient reports that her resting heart rate in 110-120  She was told she may need pacemaker  will get EP consult further management  echocardiogram  get cardiac enzyme  duplex Lower extremity  less likely PE/no hypoxia/no risk factor/alternate diagnosis likely.    Puffy face:  gets steroid injection in the back for chronic back pain  denies oral steroid intake  get TSH  get Cortisol    Alcohol abuse:  in intoxication  CIWA/symptom triggered    Drug abuse:  agrees to talk to Dr Atif Jurado for detox  urine + for methadone, cocaine and percocet    DVT ppx: lovenox    Dispo: pending Palpitation: SVT Vs Cocaine induced   SVT failed ablation 4 times  EKG obtained in the ED showed sinus tachycardia and heart rate improved from 160s to 120 after giving lorazepam  patient reports that her resting heart rate in 110-120  She was told she may need pacemaker  will get EP consult further management  echocardiogram  get cardiac enzyme  duplex Lower extremity  less likely PE/no hypoxia/no risk factor/alternate diagnosis likely.  detox  telemonitoring for now  repeat 12 lead ekg    Puffy face:  gets steroid injection in the back for chronic back pain  denies oral steroid intake  get TSH  get Cortisol    Alcohol abuse:  in intoxication  CIWA/symptom triggered  thiamine  folate    Magnesium def:  repleted    Drug abuse:  agrees to talk to Dr Atif Jurado for detox  urine + for methadone, cocaine and percocet    DVT ppx: lovenox    Dispo: pending Palpitation: SVT Vs Cocaine induced   SVT failed ablation 4 times  EKG obtained in the ED showed sinus tachycardia and heart rate improved from 160s to 120 after giving lorazepam  patient reports that her resting heart rate in 110-120  She was told she may need pacemaker  will get EP consult further management  echocardiogram  get cardiac enzyme  duplex Lower extremity  less likely PE/no hypoxia/no risk factor/alternate diagnosis likely.  detox  telemonitoring for now  avoid beta blocker since active cocaine use, last 2 days back    Puffy face:  gets steroid injection in the back for chronic back pain  denies oral steroid intake  get TSH  get Cortisol    Alcohol abuse:  in intoxication  CIWA/symptom triggered  thiamine  folate    Magnesium def:  repleted    Drug abuse:  agrees to talk to Dr Atif Jurado for detox  urine + for methadone, cocaine and percocet    DVT ppx: lovenox    Dispo: pending

## 2019-12-21 NOTE — CONSULT NOTE ADULT - ASSESSMENT
38 yo F with PMH of SVT s/p 4 failed ablations, scoliosis, alcoholism w/ prior withdrawal symptoms (DTs), and Multiple drug abuse presenting to ED with tachycardia up to 160's found at her primary doctor office.    Impression:  Sinus tachycardia 160 BPM, most likely d/t withdrawal from ETOH and drugs    Plan:  - Ativan protocol  - Psych c/s for ETOH/drug abuse  - 2D echo to r/o underlying cardiomyopathy  - Please recall if needed

## 2019-12-21 NOTE — H&P ADULT - NSHPLABSRESULTS_GEN_ALL_CORE
13.4   9.47  )-----------( 199      ( 20 Dec 2019 21:55 )             38.7             12-20    141  |  98  |  15  ----------------------------<  120<H>  3.6   |  25  |  0.7    Ca    9.6      20 Dec 2019 21:55    TPro  7.9  /  Alb  5.0  /  TBili  0.2  /  DBili  x   /  AST  33  /  ALT  14  /  AlkPhos  67  12-20    LIVER FUNCTIONS - ( 20 Dec 2019 21:55 )  Alb: 5.0 g/dL / Pro: 7.9 g/dL / ALK PHOS: 67 U/L / ALT: 14 U/L / AST: 33 U/L / GGT: x                   CARDIAC MARKERS ( 20 Dec 2019 21:55 )  x     / <0.01 ng/mL / x     / x     / x

## 2019-12-21 NOTE — ED PROVIDER NOTE - PROGRESS NOTE DETAILS
Informed patient of all lab/radiology results. Does not want detox at this time but agrees to stay in hospital for further monitoring. Hemodynamically stable, tachycardia improved.

## 2019-12-21 NOTE — ED PROVIDER NOTE - ATTENDING CONTRIBUTION TO CARE
39 F to ED with CP and tachycardia, sent [place] ED [place] [facility] [place] of [place]cardia and by pmd Dr. Marcano for eval.  PMD concerned for etoh abuse and withdrawal  No fevers, no sick conacts, no travels, but pt presented with a HR of 160, in ED after librium and IVF HR improved to 110s and pt calm and comfortable.   AVSS, exam as noted, CTAB, RRR, abdomen soft NTND, (+) bowel sounds, neuro nonfocal

## 2019-12-21 NOTE — ED PROVIDER NOTE - NS ED ROS FT
Constitutional:  No fevers or chills.  Eyes:  No visual changes, eye pain, or discharge.  ENT:  No sore throat.  Neck:  No neck pain.  Cardiac:  No CP or edema. +Palpitations.  Resp:  No cough or SOB. No hemoptysis.  GI:  No nausea, vomiting, diarrhea, or abdominal pain.  :  No dysuria, frequency, or hematuria.  MSK:  No myalgias or joint pain/swelling.  Neuro:  No headache, dizziness, or weakness.  Skin:  No skin rash.

## 2019-12-21 NOTE — ED PROVIDER NOTE - PHYSICAL EXAMINATION
PHYSICAL EXAM: I have reviewed current vital signs.  GENERAL: NAD, well-nourished; well-developed.  HEAD:  Normocephalic, atraumatic.  EYES: Mild conjunctival injection, PERRL.  ENT: MMM, no erythema/exudates.  NECK: Supple, FROM.  CHEST/LUNG: Clear to auscultation bilaterally; no wheezes, rales, or rhonchi.  HEART: Tachycardic; no murmurs, rubs, or gallops.  ABDOMEN: Soft, nontender, nondistended.  EXTREMITIES:  2+ peripheral pulses; FROM.  PSYCH: Cooperative, appropriate, normal mood and affect.  NEUROLOGY: A&O x 3. Motor 5/5. No focal neurological deficits.   SKIN: Warm and dry.

## 2019-12-21 NOTE — H&P ADULT - NSHPSOCIALHISTORY_GEN_ALL_CORE
Uses street cocaine last intake 2 days back  street methadone Uses street cocaine last intake 2 days back  street methadone last intake 2 days back    no smoking or marijuana

## 2019-12-21 NOTE — ED PROVIDER NOTE - OBJECTIVE STATEMENT
38yo F with PMH of SVT s/p 4 ablations, scoliosis, alcoholism, and cocaine abuse presenting to ED with tachycardia up to 160's. Patient was sent by PCP Dr. Marcano due to concern for tachycardia. Patient also states she has had R foot numbness/tingling x 2-3 weeks. Denies any vision changes, HA, neck/back pain, CP, SOB, abd pain, n/v/d, rashes, or injuries/traumas/falls. Ablation/cards in [place] and [place]. 38yo F with PMH of SVT s/p 4 ablations, scoliosis, alcoholism w/ prior w/drawal sxs (DTs), and cocaine abuse presenting to ED with tachycardia up to 160's in primary's office. Patient was sent by PCP Dr. Marcano due to concern for tachycardia. Patient also states she has had R foot numbness/tingling x 2-3 weeks (no injuries/trauma, no focal deficits, walking without difficulty). Denies any vision changes, HA, neck/back pain, CP, SOB, abd pain, n/v/d, rashes, or injuries/traumas/falls. Ablation/cards in [place] and [place]. Has been to detox twice in the past, not interested in detox at this time. Last used cocaine 2 days ago, snorts, no IVDU or additional drug use. Last drink was a few hours PTA, drinks vodka daily, up to 1L. Has been drinking daily since 05/19. Primary was concerned about possible clot although patient has no history of DVT/PE, no calf swelling, and no recent surgeries/immobilizations.

## 2019-12-22 LAB
ALBUMIN SERPL ELPH-MCNC: 4 G/DL — SIGNIFICANT CHANGE UP (ref 3.5–5.2)
ALP SERPL-CCNC: 52 U/L — SIGNIFICANT CHANGE UP (ref 30–115)
ALT FLD-CCNC: 12 U/L — SIGNIFICANT CHANGE UP (ref 0–41)
ANION GAP SERPL CALC-SCNC: 15 MMOL/L — HIGH (ref 7–14)
AST SERPL-CCNC: 22 U/L — SIGNIFICANT CHANGE UP (ref 0–41)
BILIRUB DIRECT SERPL-MCNC: 0.2 MG/DL — SIGNIFICANT CHANGE UP (ref 0–0.2)
BILIRUB INDIRECT FLD-MCNC: 0.4 MG/DL — SIGNIFICANT CHANGE UP (ref 0.2–1.2)
BILIRUB SERPL-MCNC: 0.6 MG/DL — SIGNIFICANT CHANGE UP (ref 0.2–1.2)
BUN SERPL-MCNC: 10 MG/DL — SIGNIFICANT CHANGE UP (ref 10–20)
CALCIUM SERPL-MCNC: 9 MG/DL — SIGNIFICANT CHANGE UP (ref 8.5–10.1)
CHLORIDE SERPL-SCNC: 102 MMOL/L — SIGNIFICANT CHANGE UP (ref 98–110)
CO2 SERPL-SCNC: 24 MMOL/L — SIGNIFICANT CHANGE UP (ref 17–32)
CREAT SERPL-MCNC: 0.6 MG/DL — LOW (ref 0.7–1.5)
GLUCOSE SERPL-MCNC: 121 MG/DL — HIGH (ref 70–99)
MAGNESIUM SERPL-MCNC: 1.8 MG/DL — SIGNIFICANT CHANGE UP (ref 1.8–2.4)
PHOSPHATE SERPL-MCNC: 3.4 MG/DL — SIGNIFICANT CHANGE UP (ref 2.1–4.9)
POTASSIUM SERPL-MCNC: 3.4 MMOL/L — LOW (ref 3.5–5)
POTASSIUM SERPL-SCNC: 3.4 MMOL/L — LOW (ref 3.5–5)
PROT SERPL-MCNC: 6.3 G/DL — SIGNIFICANT CHANGE UP (ref 6–8)
SODIUM SERPL-SCNC: 141 MMOL/L — SIGNIFICANT CHANGE UP (ref 135–146)

## 2019-12-22 PROCEDURE — 74176 CT ABD & PELVIS W/O CONTRAST: CPT | Mod: 26

## 2019-12-22 PROCEDURE — 99233 SBSQ HOSP IP/OBS HIGH 50: CPT

## 2019-12-22 RX ORDER — ACETAMINOPHEN 500 MG
650 TABLET ORAL EVERY 6 HOURS
Refills: 0 | Status: DISCONTINUED | OUTPATIENT
Start: 2019-12-22 | End: 2019-12-25

## 2019-12-22 RX ORDER — POTASSIUM CHLORIDE 20 MEQ
40 PACKET (EA) ORAL ONCE
Refills: 0 | Status: DISCONTINUED | OUTPATIENT
Start: 2019-12-22 | End: 2019-12-23

## 2019-12-22 RX ORDER — PANTOPRAZOLE SODIUM 20 MG/1
40 TABLET, DELAYED RELEASE ORAL
Refills: 0 | Status: DISCONTINUED | OUTPATIENT
Start: 2019-12-22 | End: 2019-12-25

## 2019-12-22 RX ADMIN — Medication 1 MILLIGRAM(S): at 11:01

## 2019-12-22 RX ADMIN — Medication 100 MILLIGRAM(S): at 11:01

## 2019-12-22 RX ADMIN — Medication 2 MILLIGRAM(S): at 01:21

## 2019-12-22 RX ADMIN — Medication 2 MILLIGRAM(S): at 11:01

## 2019-12-22 RX ADMIN — Medication 2 MILLIGRAM(S): at 04:48

## 2019-12-22 RX ADMIN — Medication 2 MILLIGRAM(S): at 14:13

## 2019-12-22 RX ADMIN — Medication 2 MILLIGRAM(S): at 19:56

## 2019-12-22 RX ADMIN — Medication 2 MILLIGRAM(S): at 15:56

## 2019-12-22 NOTE — CONSULT NOTE ADULT - ASSESSMENT
Assessment:  Batsheva Carvajal is a 38 yo , single woman, domiciled with fiance, employed, with no known past psychiatric history, IPP admissions or past suicide attempts, with alcohol use disorder, opioid use disorder, and cocaine use disorder, who is currently admitted to medicine for workup of palpitations. Upon assessment, pt presents with slight b/l hand tremors however otherwise does not present with acute symptoms of alcohol withdrawal, however pt was recently given Ativan 2mg PRN  at 1400 and 1500 prior to interview. Pt has history of severe alcohol withdrawal (h/o DT and seizures) and thus recommend giving Ativan 2mg q2h PRN to keep pt out of acute withdrawal until pt is stable for 24 hours. At which point, count the total amount of ativan required in last 24 hours to keep patient out of acute withdrawal. Tomorrow, decrease this total by 25% and give the pt that amount of ativan in divided doses throughout the day, giving PRN ativan for any breakthrough symptoms. The next day, again add up the total amount of ativan required to keep the pt out of acute withdrawal, including any PRN doses given, and again decreased by 25% each day, so on and so fourth to safely detox pt from acute alcohol withdrawal. Pt was educated on the importance of rehab for alcohol use, however she is not agreeable at this time. Please continue to encourage pt for inpatient rehab for alcohol use upon medical clearance. Pt does also c/o diarrhea and body pains/aches likely secondary to acute opioid withdrawal secondary to her significant percocet use. Recommend symptomatic treatment, see the plan below for further details.       Plan:  - For Alcohol withdrawal -- count the total amount of ativan required in last 24 hours to keep patient out of acute withdrawal. Tomorrow, decrease this total by 25% and give the pt that amount of ativan in divided doses throughout the day, giving PRN ativan for any breakthrough symptoms. The next day, again add up the total amount of ativan required to keep the pt out of acute withdrawal, including any PRN doses given, and again decreased by 25% each day, so on and so fourth to safely detox pt from acute alcohol withdrawal   - Pt has h/o of opioid use -- recommend offering symptomatic treatment:  - Assessment:  Batsheva Carvajal is a 40 yo , single woman, domiciled with fiance, employed, with no known past psychiatric history, IPP admissions or past suicide attempts, with alcohol use disorder, opioid use disorder, and cocaine use disorder, who is currently admitted to medicine for workup of palpitations. Upon assessment, pt presents with slight b/l hand tremors however otherwise does not present with acute symptoms of alcohol withdrawal, however pt was recently given Ativan 2mg PRN  at 1400 and 1500 prior to interview. Pt has history of severe alcohol withdrawal (h/o DT and seizures) and thus recommend giving Ativan 2mg q2h PRN to keep pt out of acute withdrawal until pt is stable for 24 hours. At which point, count the total amount of ativan required in last 24 hours to keep patient out of acute withdrawal. Tomorrow, decrease this total by 25% and give the pt that amount of ativan in divided doses throughout the day, giving PRN ativan for any breakthrough symptoms. The next day, again add up the total amount of ativan required to keep the pt out of acute withdrawal, including any PRN doses given, and again decreased by 25% each day, so on and so fourth to safely detox pt from acute alcohol withdrawal. Pt was educated on the importance of rehab for alcohol use, however she is not agreeable at this time. Please continue to encourage pt for inpatient rehab for alcohol use upon medical clearance. Pt does also c/o diarrhea and body pains/aches likely secondary to acute opioid withdrawal secondary to her significant percocet use. Recommend symptomatic treatment, see the plan below for further details.       Plan:  #Alcohol withdrawal  - CIWA protocol q2h; give Ativan 2mg PRN q2h with close monitoring for DTs/seizures as pt has prior history; please be watchful for delirium, agitation, tachycardia, hypertension, fever, diaphoresis  - Count the total amount of ativan required in last 24 hours to keep patient out of acute withdrawal. Tomorrow, decrease this total by 25% and give the pt that amount of ativan in divided doses throughout the day, giving PRN ativan for any breakthrough symptoms. The next day, again add up the total amount of ativan required to keep the pt out of acute withdrawal, including any PRN doses given, and again decreased by 25% each day, so on and so fourth to safely detox pt from acute alcohol withdrawal   - Continue with thiamine/folic acid    #Opioid use disorder  - COWS q4h to check for withdrawal symptoms  - Provide symptomatic treatment for any breakthrough symptoms: Unstable blood pressure = Clonidine, anxiety = Vistaril 25-50mg PRN q6h, diarrhea = imodium, rhinorrhea = sudafed.  - For pain, please avoid mixed agonist and antagonist opioid analgesics, such as pentazocine, nalbuphine, and butorphanol.  - Please refer patient on discharge to  [facility], [address] [place] [address]- phone - [phone]    Attending note to follow Assessment:  Batsheva Carvajal is a 38 yo , single woman, domiciled with fiance, employed, with no known past psychiatric history, IPP admissions or past suicide attempts, with alcohol use disorder, opioid use disorder, and cocaine use disorder, who is currently admitted to medicine for workup of palpitations. Upon assessment, pt presents with slight b/l hand tremors however otherwise does not present with acute symptoms of alcohol withdrawal, however pt was recently given Ativan 2mg PRN  at 1400 and 1500 prior to interview. Pt has history of severe alcohol withdrawal (h/o DT and seizures) and thus recommend giving Ativan 2mg q4h standing with Ativan 1mg q2h PRN to keep pt out of acute withdrawal until pt is stable for 24 hours. At which point, count the total amount of ativan required in last 24 hours to keep patient out of acute withdrawal. Tomorrow, decrease this total by 25% and give the pt that amount of ativan in divided doses throughout the day, giving PRN ativan for any breakthrough symptoms. The next day, again add up the total amount of ativan required to keep the pt out of acute withdrawal, including any PRN doses given, and again decreased by 25% each day, so on and so fourth to safely detox pt from acute alcohol withdrawal. Pt was educated on the importance of rehab for alcohol use, however she is not agreeable at this time. Please continue to encourage pt for inpatient rehab for alcohol use upon medical clearance. Pt does also c/o diarrhea and body pains/aches likely secondary to acute opioid withdrawal secondary to her significant percocet use. Recommend symptomatic treatment, see the plan below for further details.       Plan:  #Alcohol withdrawal  - Give Ativan 2mg po q4h standing, with Ativan 1mg q2h po PRN for breakthrough symptoms with close monitoring for DTs/seizures as pt has prior history; please be watchful for delirium, agitation, tachycardia, hypertension, fever, diaphoresis; low threshold for ICU upgrade   - Count the total amount of ativan required in last 24 hours to keep patient out of acute withdrawal. Tomorrow, decrease this total by 25% and give the pt that amount of ativan in divided doses throughout the day, giving PRN ativan for any breakthrough symptoms. The next day, again add up the total amount of ativan required to keep the pt out of acute withdrawal, including any PRN doses given, and again decreased by 25% each day, so on and so fourth to safely detox pt from acute alcohol withdrawal   - Continue with thiamine/folic acid    #Opioid use disorder  - COWS q4h to check for withdrawal symptoms  - Provide symptomatic treatment for any breakthrough symptoms: Unstable blood pressure = Clonidine, anxiety = Vistaril 25-50mg PRN q6h, diarrhea = imodium, rhinorrhea = sudafed  - For pain, please avoid mixed agonist and antagonist opioid analgesics, such as pentazocine, nalbuphine, and butorphanol.  - Please refer patient on discharge to  [facility], [address] [place] [address]- phone - [phone]    Attending note to follow

## 2019-12-22 NOTE — PROGRESS NOTE ADULT - ASSESSMENT
# Palpitations  - SVT vs Cocaine induced vs other  - Less likely PE as no hypoxia/risk factor  - S/p 4 failed SVT ablation 4x   - EKG in ED: sinus tachycardia and HR improved 120s <- 160s to 120s after lorazepam (baseline 110-120 per pt)  - EP: CIWA, psych, 2D echo  - Trops: neg 2x   - Duplex LE: no DVT   - C/w tele   - F/u 2D echo  - avoid BB since active cocaine use, last 2 days prior to presentation     # Puffy face  - gets steroid injection in the back for chronic back pain  - denies oral steroid intake  - Cortisol low 0.8  - TSH normal     # Alcohol abuse  - CIWA protocol  - C/w thiamine, folate    # Magnesium def  - repleted    # Drug abuse  - urine + for methadone, cocaine and percocet  - F/u Addiction medicine c/s (Dr. Atif Jurado) for detox     DVT ppx: Lovenox  GI ppx: Protonix  Diet: Regular   Activity: Increase as tolerated   Dispo: pending A 38 yo F with PMHx of  SVT s/p 4 failed ablations, scoliosis, alcoholism w/ prior withdrawal symptoms (DTs), and Multiple drug abuse sent from PMD office for tachycardia up to 160's.      # Palpitations  - SVT vs Cocaine induced vs other  - Less likely PE as no hypoxia/risk factor  - S/p 4 failed SVT ablation 4x   - EKG in ED: sinus tachy and HR improved 120s <- 160s after lorazepam (baseline 110-120 per pt)  - EP: CIWA, psych, 2D echo  - Trops: neg 2x   - Duplex LE: no DVT   - CXR: unremarkable  - C/w tele   - F/u 2D echo  - avoid BB since active cocaine use, last 2 days prior to presentation     # Puffy face  - Had 2 steroid injections in the back for chronic back pain  - Denies oral steroid intake  - Cortisol low 0.8  - TSH normal     # Alcohol abuse with 1L vodka daily   - Last drink 12/20  - CIWA protocol  - C/w thiamine, folate    # Magnesium def  - repleted    # Drug abuse  - urine + for methadone, cocaine and percocet  - Gets methadone on the streets   - F/u Addiction medicine c/s (Dr. Atif Jurado) for detox     DVT ppx: Lovenox  GI ppx: Protonix  Diet: Regular   Activity: Increase as tolerated   Dispo: pending A 40 yo F with PMHx of  SVT s/p 4 failed ablations, scoliosis, alcoholism w/ prior withdrawal symptoms (DTs), and Multiple drug abuse sent from PMD office for tachycardia up to 160's.      # Palpitations  - SVT vs Cocaine induced vs other  - Less likely PE as no hypoxia/risk factor  - S/p 4 failed SVT ablation 4x   - EKG in ED: sinus tachy and HR improved 120s <- 160s after lorazepam (baseline 110-120 per pt)  - EP: CIWA, psych, 2D echo  - Trops: neg 2x   - Duplex LE: no DVT   - CXR: unremarkable  - C/w tele   - F/u 2D echo  - Avoid BB since active cocaine use, last 2 days prior to presentation     # Puffy face  - Had 2 steroid injections in the back for chronic back pain  - Denies oral steroid intake  - Cortisol low 0.8  - TSH normal     # Alcohol abuse with 1L vodka daily   - Last drink 12/20  - CIWA protocol  - C/w thiamine, folate    # Magnesium def  - repleted    # Drug abuse  - urine + for methadone, cocaine and percocet  - Gets methadone on the streets   - F/u Addiction medicine c/s (Dr. Atif Jurado) for detox     DVT ppx: Lovenox  GI ppx: Protonix  Diet: Regular   Activity: Increase as tolerated   Dispo: pending A 40 yo F with PMHx of  SVT s/p 4 failed ablations, scoliosis, alcoholism w/ prior withdrawal symptoms (DTs), and Multiple drug abuse sent from PMD office for tachycardia up to 160's.      # Palpitations  - SVT vs Cocaine induced vs other  - Less likely PE as no hypoxia/risk factor  - S/p 4 failed SVT ablation 4x   - EKG in ED: sinus tachy and HR improved 120s <- 160s after lorazepam (baseline 110-120 per pt)  - EP: CIWA, psych, 2D echo  - Trops: neg 2x   - Duplex LE: no DVT   - CXR: unremarkable  - C/w tele   - F/u 2D echo  - Avoid BB since active cocaine use, last 2 days prior to presentation     # Puffy face  - Had 2 steroid injections in the back for chronic back pain  - Denies oral steroid intake  - Cortisol low 0.8, no electrolyte abnormalities,    - TSH normal     # Alcohol abuse with 1L vodka daily   - Last drink 12/20  - CIWA protocol  - C/w thiamine, folate    # Magnesium def  - repleted    # Drug abuse  - urine + for methadone, cocaine and percocet  - Gets methadone on the streets   - F/u Addiction medicine c/s (Dr. Atif Jurado) for detox     DVT ppx: Lovenox  GI ppx: Protonix  Diet: Regular   Activity: Increase as tolerated   Dispo: pending A 40 yo F with PMHx of  SVT s/p 4 failed ablations, scoliosis, alcoholism w/ prior withdrawal symptoms (DTs), and Multiple drug abuse sent from PMD office for tachycardia up to 160's.      # Palpitations  - SVT vs Cocaine induced vs other  - Less likely PE as no hypoxia/risk factor  - S/p 4 failed SVT ablation 4x   - EKG in ED: sinus tachy and HR improved 120s <- 160s after lorazepam (baseline 110-120 per pt)  - EP: CIWA, psych, 2D echo  - Trops: neg 2x   - Duplex LE: no DVT   - CXR: unremarkable  - Avoid BB since active cocaine use, last 2 days prior to presentation   - C/w tele   - F/u 2D echo    # Puffy face  - Had 2 steroid injections in the back for chronic back pain  - Denies oral steroid intake  - Cortisol low 0.8, no electrolyte abnormalities  - TSH normal   - F/u CT abd reading for adrenal pathology     # Alcohol abuse with 1L vodka daily   - Last drink 12/20  - CIWA protocol  - C/w thiamine, folate    # Magnesium def  - repleted    # Drug abuse  - urine + for methadone, cocaine and percocet  - Gets methadone on the streets   - F/u Addiction medicine c/s (Dr. Atif Jurado) for detox     DVT ppx: Lovenox  GI ppx: Protonix  Diet: Regular   Activity: Increase as tolerated   Dispo: pending

## 2019-12-22 NOTE — CONSULT NOTE ADULT - SUBJECTIVE AND OBJECTIVE BOX
Subjective:    CC: "I'm in pain"    HPI: Batsheva Carvajal is a 40 yo , single woman, domiciled with fiance, employed, with no known past psychiatric history, IPP admissions or past suicide attempts, with alcohol use disorder, opioid use disorder, and cocaine use disorder, who is currently admitted to medicine for workup of palpitations. Pt currently under [facility] protocol, and has been given total of 14mg Ativan since 0700 on 12-21-19 -- given Ativan 2mg at 0700, 1mg at 1200, 1mg @1600, and then on 12-22-19, given Ativan 2mg at 00:00, 1100, 1400, and 1500. Upon approach, pt is AOx4, states that she is currently in pain, states that she has pain all over her body, and she came to the hospital because of the pain and edema that she states was notably in her face and b/l LE. States that she dustin to her primary MD who referred her to ED for further evaluation. States that she currently has generalized body aches, diarrhea, and feels tremulous (writer is able to appreciate slight tremors in b/l UE upon outstretched arms). States that she has last drank alcohol on Friday where she drank 1L of vodka; reports drinking 1L vodka daily for last 10 years. States she has h/o withdrawal seizures and DTs; last and only time 3  years ago. Reports she has been to detox twice for alcohol withdrawal, first in march 2019 and second time in may 2019 at [facility]. Reports one prior inpatient rehab admission for alcohol use in [place]; currently denies wanting to be admitted to inpatient rehab despite education and encouragement from writer regarding the importance of decreasing/abstaining from alcohol/substance use. States that she doesn't want her kids to know that she is abusing alcohol again. Reports recent cocaine use, last used 2 days ago where she had "one bump"; denies daily use, states she uses "1-2 times per month", and that she often goes months without using it. States she also has used percocet recently; last use day prior to admission where she took 10 tabs of 10mg perocet; reports using this amount for last 5 years. Denies other substance use. Endorses low energy and difficulty concentrating, and since being admitted, reports sleep and appetite disturbances, however denies other signs and symptoms concerning for depression, elle or psychosis. Denies SI/HI, intent and plan.     Past substance use history: See HPI. Reports she started using alcohol when she was 18yo. Started using cocaine also when she was 18yo. Started using percocet 5 years ago. States she uses methadone whenever she is unable to get percocet; states she gets percocet and methadone off the streets. Reports when she does use methadone, she takes around 35 mg; typically uses around 1-2 times per moth; last used 4-5 days ago. States at her peak, she was using around 15 tabs of 10mg percocet, and at her minimum has been using 7-8 tabs of 10mg percocet daily.     Past psychiatric history: None reported    Family substance use history: Reports mother uses crystal meth and father is an alcoholic and also uses marijuana    Family psychiatric history: Reports brother diagnosed with schizophrenia     Social History: Lives with ance; has four children: 22 yo daughter, 19 yo son, 18yo son, and 10  yo son -- oldest daughter is marriedd and lives with ; her three sons live with their father in [place]. Graduated with associates degree in dental hygiene; currently works as dental hygienist.      PMHx: Per chart review, PMH of SVT s/p 4 failed ablations, scoliosis    Objective:  Vitals:  Vital Signs Last 24 Hrs  T(C): 37.3 (22 Dec 2019 13:47), Max: 37.3 (22 Dec 2019 13:47)  T(F): 99.2 (22 Dec 2019 13:47), Max: 99.2 (22 Dec 2019 13:47)  HR: 100 (22 Dec 2019 13:47) (78 - 100)  BP: 136/75 (22 Dec 2019 13:47) (118/86 - 136/75)  BP(mean): 98 (22 Dec 2019 13:47) (98 - 98)  RR: 18 (22 Dec 2019 13:47) (18 - 20)  SpO2: 100% (22 Dec 2019 13:47) (98% - 100%)    Labs:  12-22    141  |  102  |  10  ----------------------------<  121<H>  3.4<L>   |  24  |  0.6<L>    Ca    9.0      22 Dec 2019 09:32  Phos  3.4     12-22  Mg     1.8     12-22    TPro  6.3  /  Alb  4.0  /  TBili  0.6  /  DBili  0.2  /  AST  22  /  ALT  12  /  AlkPhos  52  12-22    CBC Full  -  ( 21 Dec 2019 11:27 )  WBC Count : 5.02 K/uL  RBC Count : 3.16 M/uL  Hemoglobin : 10.7 g/dL  Hematocrit : 32.2 %  Platelet Count - Automated : 157 K/uL  Mean Cell Volume : 101.9 fL  Mean Cell Hemoglobin : 33.9 pg  Mean Cell Hemoglobin Concentration : 33.2 g/dL  Auto Neutrophil # : 2.78 K/uL  Auto Lymphocyte # : 1.77 K/uL  Auto Monocyte # : 0.40 K/uL  Auto Eosinophil # : 0.03 K/uL  Auto Basophil # : 0.03 K/uL  Auto Neutrophil % : 55.3 %  Auto Lymphocyte % : 35.3 %  Auto Monocyte % : 8.0 %  Auto Eosinophil % : 0.6 %  Auto Basophil % : 0.6 %    Alcohol, Blood (12.20.19 @ 21:55)    Alcohol, Blood: 245 mg/dL    Mental status exam: Is well appearing, in no acute distress, appears stated age. Congruent and constricted affect. Mood: "Not great". Thought process linear. Thought content: Unremarkable; denies SI/HI, intent and plan. Speech: Normal rate, rhythm, tone, articulation. Memory recent and remote intact. Insight: Fair. Judgement: Fair.

## 2019-12-22 NOTE — PROGRESS NOTE ADULT - SUBJECTIVE AND OBJECTIVE BOX
Hospital Day:  1d    Subjective:    Patient is a 39y old  Female who presents with a chief complaint of Palpitation (21 Dec 2019 10:19)      Past Medical Hx:   Polysubstance dependence  SVT (supraventricular tachycardia)    Past Sx:  H/O prior ablation treatment    Allergies:  codeine (Rash)    Current Meds:   Standng Meds:  enoxaparin Injectable 40 milliGRAM(s) SubCutaneous every 24 hours  folic acid 1 milliGRAM(s) Oral daily  thiamine 100 milliGRAM(s) Oral daily    PRN Meds:  LORazepam     Tablet 2 milliGRAM(s) Oral every 1 hour PRN CIWA-Ar score 8 or greater    HOME MEDICATIONS:      Vital Signs:   T(F): 98.5 (12-22-19 @ 06:24), Max: 98.8 (12-21-19 @ 21:26)  HR: 78 (12-22-19 @ 06:24) (78 - 97)  BP: 118/86 (12-22-19 @ 06:24) (102/63 - 129/82)  RR: 18 (12-22-19 @ 06:24) (18 - 20)  SpO2: 98% (12-21-19 @ 21:26) (98% - 99%)        Physical Exam:   GENERAL: NAD  HEENT: NCAT  CHEST/LUNG: CTAB  HEART: Regular rate and rhythm; s1 s2 appreciated, No murmurs, rubs, or gallops  ABDOMEN: Soft, Nontender, Nondistended; Bowel sounds present  EXTREMITIES: No LE edema b/l  NERVOUS SYSTEM:  Alert & Oriented X3        Labs:                         10.7   5.02  )-----------( 157      ( 21 Dec 2019 11:27 )             32.2     Neutophil% 55.3, Lymphocyte% 35.3, Monocyte% 8.0, Bands% 0.2 12-21-19 @ 11:27    22 Dec 2019 09:32    141    |  102    |  10     ----------------------------<  121    3.4     |  24     |  0.6      Ca    9.0        22 Dec 2019 09:32  Phos  3.4       22 Dec 2019 09:32  Mg     1.8       22 Dec 2019 09:32    TPro  6.3    /  Alb  4.0    /  TBili  0.6    /  DBili  0.2    /  AST  22     /  ALT  12     /  AlkPhos  52     22 Dec 2019 09:32              Troponin <0.01, CKMB 1.2, CK -- 12-21-19 @ 11:27  Troponin <0.01, CKMB --, CK -- 12-20-19 @ 21:55 Hospital Day:  1d    Subjective:    Patient is a 39y old  Female who presents with a chief complaint of Palpitation. No acute events       Past Medical Hx:   Polysubstance dependence  SVT (supraventricular tachycardia)    Past Sx:  H/O prior ablation treatment    Allergies:  codeine (Rash)    Current Meds:   Standng Meds:  enoxaparin Injectable 40 milliGRAM(s) SubCutaneous every 24 hours  folic acid 1 milliGRAM(s) Oral daily  thiamine 100 milliGRAM(s) Oral daily    PRN Meds:  LORazepam     Tablet 2 milliGRAM(s) Oral every 1 hour PRN CIWA-Ar score 8 or greater    HOME MEDICATIONS:      Vital Signs:   T(F): 98.5 (12-22-19 @ 06:24), Max: 98.8 (12-21-19 @ 21:26)  HR: 78 (12-22-19 @ 06:24) (78 - 97)  BP: 118/86 (12-22-19 @ 06:24) (102/63 - 129/82)  RR: 18 (12-22-19 @ 06:24) (18 - 20)  SpO2: 98% (12-21-19 @ 21:26) (98% - 99%)        Physical Exam:   GENERAL: NAD  HEENT: NCAT  CHEST/LUNG: CTAB  HEART: Regular rate and rhythm; s1 s2 appreciated, No murmurs, rubs, or gallops  ABDOMEN: Soft, Nontender, Nondistended; Bowel sounds present  EXTREMITIES: No LE edema b/l  NERVOUS SYSTEM:  Alert & Oriented X3        Labs:                         10.7   5.02  )-----------( 157      ( 21 Dec 2019 11:27 )             32.2     Neutophil% 55.3, Lymphocyte% 35.3, Monocyte% 8.0, Bands% 0.2 12-21-19 @ 11:27    22 Dec 2019 09:32    141    |  102    |  10     ----------------------------<  121    3.4     |  24     |  0.6      Ca    9.0        22 Dec 2019 09:32  Phos  3.4       22 Dec 2019 09:32  Mg     1.8       22 Dec 2019 09:32    TPro  6.3    /  Alb  4.0    /  TBili  0.6    /  DBili  0.2    /  AST  22     /  ALT  12     /  AlkPhos  52     22 Dec 2019 09:32              Troponin <0.01, CKMB 1.2, CK -- 12-21-19 @ 11:27  Troponin <0.01, CKMB --, CK -- 12-20-19 @ 21:55 Hospital Day:  1d    Subjective:    Patient is a 39y old  Female who presents with a chief complaint of Palpitation. No acute events overnight and in no distress this am. Endorses restlessness and formed diarrhea x2 days. Denies palpitations, c/p, sob, tremors, visual halluciations, H/A, change in vision.     Admitted to [facility] for a diagnosis of palpitations.        Past Medical Hx:   Polysubstance dependence  SVT (supraventricular tachycardia)    Past Sx:  H/O prior ablation treatment    Allergies:  codeine (Rash)    Current Meds:   Standng Meds:  enoxaparin Injectable 40 milliGRAM(s) SubCutaneous every 24 hours  folic acid 1 milliGRAM(s) Oral daily  thiamine 100 milliGRAM(s) Oral daily    PRN Meds:  LORazepam     Tablet 2 milliGRAM(s) Oral every 1 hour PRN CIWA-Ar score 8 or greater    HOME MEDICATIONS:      Vital Signs:   T(F): 98.5 (12-22-19 @ 06:24), Max: 98.8 (12-21-19 @ 21:26)  HR: 78 (12-22-19 @ 06:24) (78 - 97)  BP: 118/86 (12-22-19 @ 06:24) (102/63 - 129/82)  RR: 18 (12-22-19 @ 06:24) (18 - 20)  SpO2: 98% (12-21-19 @ 21:26) (98% - 99%)        Physical Exam:   GENERAL: NAD  HEENT: NCAT  CHEST/LUNG: CTAB  HEART: Regular rate and rhythm; s1 s2 appreciated, No murmurs, rubs, or gallops  ABDOMEN: Soft, Nontender, Nondistended; Bowel sounds present  EXTREMITIES: No LE edema b/l  NERVOUS SYSTEM:  Alert & Oriented X3        Labs:                         10.7   5.02  )-----------( 157      ( 21 Dec 2019 11:27 )             32.2     Neutophil% 55.3, Lymphocyte% 35.3, Monocyte% 8.0, Bands% 0.2 12-21-19 @ 11:27    22 Dec 2019 09:32    141    |  102    |  10     ----------------------------<  121    3.4     |  24     |  0.6      Ca    9.0        22 Dec 2019 09:32  Phos  3.4       22 Dec 2019 09:32  Mg     1.8       22 Dec 2019 09:32    TPro  6.3    /  Alb  4.0    /  TBili  0.6    /  DBili  0.2    /  AST  22     /  ALT  12     /  AlkPhos  52     22 Dec 2019 09:32              Troponin <0.01, CKMB 1.2, CK -- 12-21-19 @ 11:27  Troponin <0.01, CKMB --, CK -- 12-20-19 @ 21:55 Hospital Day:  1d    Subjective:    Patient is a 39y old  Female who presents with a chief complaint of Palpitation. No acute events overnight and in no distress this am. Endorses restlessness and formed diarrhea x2 days. Denies palpitations, c/p, sob, tremors, visual halluciations, H/A, change in vision.     Admitted to [facility] for a diagnosis of palpitations.        Past Medical Hx:   Polysubstance dependence  SVT (supraventricular tachycardia)    Past Sx:  H/O prior ablation treatment    Allergies:  codeine (Rash)    Current Meds:   Standng Meds:  enoxaparin Injectable 40 milliGRAM(s) SubCutaneous every 24 hours  folic acid 1 milliGRAM(s) Oral daily  thiamine 100 milliGRAM(s) Oral daily    PRN Meds:  LORazepam     Tablet 2 milliGRAM(s) Oral every 1 hour PRN CIWA-Ar score 8 or greater    HOME MEDICATIONS:      Vital Signs:   T(F): 98.5 (12-22-19 @ 06:24), Max: 98.8 (12-21-19 @ 21:26)  HR: 78 (12-22-19 @ 06:24) (78 - 97)  BP: 118/86 (12-22-19 @ 06:24) (102/63 - 129/82)  RR: 18 (12-22-19 @ 06:24) (18 - 20)  SpO2: 98% (12-21-19 @ 21:26) (98% - 99%)        Physical Exam:   GENERAL: NAD, appears stated age, well nourished, unkempt, answering questions appropriately  HEENT: NCAT  CHEST/LUNG: CTAB  HEART: Regular rate and rhythm; s1 s2 appreciated, No murmurs, rubs, or gallops  ABDOMEN: Soft, Nontender, Nondistended; Bowel sounds present  EXTREMITIES: No LE edema b/l  NERVOUS SYSTEM:  Alert & Oriented X3        Labs:                         10.7   5.02  )-----------( 157      ( 21 Dec 2019 11:27 )             32.2     Neutophil% 55.3, Lymphocyte% 35.3, Monocyte% 8.0, Bands% 0.2 12-21-19 @ 11:27    22 Dec 2019 09:32    141    |  102    |  10     ----------------------------<  121    3.4     |  24     |  0.6      Ca    9.0        22 Dec 2019 09:32  Phos  3.4       22 Dec 2019 09:32  Mg     1.8       22 Dec 2019 09:32    TPro  6.3    /  Alb  4.0    /  TBili  0.6    /  DBili  0.2    /  AST  22     /  ALT  12     /  AlkPhos  52     22 Dec 2019 09:32              Troponin <0.01, CKMB 1.2, CK -- 12-21-19 @ 11:27  Troponin <0.01, CKMB --, CK -- 12-20-19 @ 21:55

## 2019-12-23 LAB
ALBUMIN SERPL ELPH-MCNC: 4.3 G/DL — SIGNIFICANT CHANGE UP (ref 3.5–5.2)
ALBUMIN SERPL ELPH-MCNC: 4.5 G/DL — SIGNIFICANT CHANGE UP (ref 3.5–5.2)
ALP SERPL-CCNC: 61 U/L — SIGNIFICANT CHANGE UP (ref 30–115)
ALP SERPL-CCNC: 64 U/L — SIGNIFICANT CHANGE UP (ref 30–115)
ALT FLD-CCNC: 11 U/L — SIGNIFICANT CHANGE UP (ref 0–41)
ALT FLD-CCNC: 12 U/L — SIGNIFICANT CHANGE UP (ref 0–41)
ANION GAP SERPL CALC-SCNC: 15 MMOL/L — HIGH (ref 7–14)
ANION GAP SERPL CALC-SCNC: 15 MMOL/L — HIGH (ref 7–14)
AST SERPL-CCNC: 17 U/L — SIGNIFICANT CHANGE UP (ref 0–41)
AST SERPL-CCNC: 18 U/L — SIGNIFICANT CHANGE UP (ref 0–41)
BASOPHILS # BLD AUTO: 0.03 K/UL — SIGNIFICANT CHANGE UP (ref 0–0.2)
BASOPHILS NFR BLD AUTO: 0.6 % — SIGNIFICANT CHANGE UP (ref 0–1)
BILIRUB SERPL-MCNC: 0.3 MG/DL — SIGNIFICANT CHANGE UP (ref 0.2–1.2)
BILIRUB SERPL-MCNC: 0.5 MG/DL — SIGNIFICANT CHANGE UP (ref 0.2–1.2)
BUN SERPL-MCNC: 10 MG/DL — SIGNIFICANT CHANGE UP (ref 10–20)
BUN SERPL-MCNC: 10 MG/DL — SIGNIFICANT CHANGE UP (ref 10–20)
C DIFF BY PCR RESULT: NEGATIVE — SIGNIFICANT CHANGE UP
C DIFF TOX GENS STL QL NAA+PROBE: SIGNIFICANT CHANGE UP
CALCIUM SERPL-MCNC: 9.5 MG/DL — SIGNIFICANT CHANGE UP (ref 8.5–10.1)
CALCIUM SERPL-MCNC: 9.5 MG/DL — SIGNIFICANT CHANGE UP (ref 8.5–10.1)
CHLORIDE SERPL-SCNC: 101 MMOL/L — SIGNIFICANT CHANGE UP (ref 98–110)
CHLORIDE SERPL-SCNC: 102 MMOL/L — SIGNIFICANT CHANGE UP (ref 98–110)
CO2 SERPL-SCNC: 25 MMOL/L — SIGNIFICANT CHANGE UP (ref 17–32)
CO2 SERPL-SCNC: 25 MMOL/L — SIGNIFICANT CHANGE UP (ref 17–32)
CREAT SERPL-MCNC: 0.7 MG/DL — SIGNIFICANT CHANGE UP (ref 0.7–1.5)
CREAT SERPL-MCNC: 0.7 MG/DL — SIGNIFICANT CHANGE UP (ref 0.7–1.5)
EOSINOPHIL # BLD AUTO: 0.1 K/UL — SIGNIFICANT CHANGE UP (ref 0–0.7)
EOSINOPHIL NFR BLD AUTO: 1.9 % — SIGNIFICANT CHANGE UP (ref 0–8)
GLUCOSE SERPL-MCNC: 109 MG/DL — HIGH (ref 70–99)
GLUCOSE SERPL-MCNC: 93 MG/DL — SIGNIFICANT CHANGE UP (ref 70–99)
HCT VFR BLD CALC: 35.2 % — SIGNIFICANT CHANGE UP (ref 34.5–45)
HCT VFR BLD CALC: 38.1 % — SIGNIFICANT CHANGE UP (ref 37–47)
HGB BLD-MCNC: 12.9 G/DL — SIGNIFICANT CHANGE UP (ref 12–16)
IMM GRANULOCYTES NFR BLD AUTO: 0.4 % — HIGH (ref 0.1–0.3)
LYMPHOCYTES # BLD AUTO: 1.16 K/UL — LOW (ref 1.2–3.4)
LYMPHOCYTES # BLD AUTO: 21.7 % — SIGNIFICANT CHANGE UP (ref 20.5–51.1)
MAGNESIUM SERPL-MCNC: 1.9 MG/DL — SIGNIFICANT CHANGE UP (ref 1.8–2.4)
MAGNESIUM SERPL-MCNC: 1.9 MG/DL — SIGNIFICANT CHANGE UP (ref 1.8–2.4)
MCHC RBC-ENTMCNC: 33.9 G/DL — SIGNIFICANT CHANGE UP (ref 32–37)
MCHC RBC-ENTMCNC: 34 PG — HIGH (ref 27–31)
MCV RBC AUTO: 100.5 FL — HIGH (ref 81–99)
MONOCYTES # BLD AUTO: 0.42 K/UL — SIGNIFICANT CHANGE UP (ref 0.1–0.6)
MONOCYTES NFR BLD AUTO: 7.9 % — SIGNIFICANT CHANGE UP (ref 1.7–9.3)
NEUTROPHILS # BLD AUTO: 3.61 K/UL — SIGNIFICANT CHANGE UP (ref 1.4–6.5)
NEUTROPHILS NFR BLD AUTO: 67.5 % — SIGNIFICANT CHANGE UP (ref 42.2–75.2)
NRBC # BLD: 0 /100 WBCS — SIGNIFICANT CHANGE UP (ref 0–0)
PHOSPHATE SERPL-MCNC: 3.4 MG/DL — SIGNIFICANT CHANGE UP (ref 2.1–4.9)
PLATELET # BLD AUTO: 169 K/UL — SIGNIFICANT CHANGE UP (ref 130–400)
POTASSIUM SERPL-MCNC: 4.1 MMOL/L — SIGNIFICANT CHANGE UP (ref 3.5–5)
POTASSIUM SERPL-MCNC: 4.1 MMOL/L — SIGNIFICANT CHANGE UP (ref 3.5–5)
POTASSIUM SERPL-SCNC: 4.1 MMOL/L — SIGNIFICANT CHANGE UP (ref 3.5–5)
POTASSIUM SERPL-SCNC: 4.1 MMOL/L — SIGNIFICANT CHANGE UP (ref 3.5–5)
PROT SERPL-MCNC: 6.8 G/DL — SIGNIFICANT CHANGE UP (ref 6–8)
PROT SERPL-MCNC: 7.3 G/DL — SIGNIFICANT CHANGE UP (ref 6–8)
RBC # BLD: 3.79 M/UL — LOW (ref 4.2–5.4)
RBC # FLD: 13.2 % — SIGNIFICANT CHANGE UP (ref 11.5–14.5)
SODIUM SERPL-SCNC: 141 MMOL/L — SIGNIFICANT CHANGE UP (ref 135–146)
SODIUM SERPL-SCNC: 142 MMOL/L — SIGNIFICANT CHANGE UP (ref 135–146)
VIT B12 SERPL-MCNC: 237 PG/ML — SIGNIFICANT CHANGE UP (ref 232–1245)
WBC # BLD: 5.34 K/UL — SIGNIFICANT CHANGE UP (ref 4.8–10.8)
WBC # FLD AUTO: 5.34 K/UL — SIGNIFICANT CHANGE UP (ref 4.8–10.8)

## 2019-12-23 PROCEDURE — 99233 SBSQ HOSP IP/OBS HIGH 50: CPT

## 2019-12-23 PROCEDURE — 93306 TTE W/DOPPLER COMPLETE: CPT | Mod: 26

## 2019-12-23 RX ORDER — PREGABALIN 225 MG/1
1000 CAPSULE ORAL DAILY
Refills: 0 | Status: DISCONTINUED | OUTPATIENT
Start: 2019-12-23 | End: 2019-12-25

## 2019-12-23 RX ORDER — POTASSIUM CHLORIDE 20 MEQ
20 PACKET (EA) ORAL ONCE
Refills: 0 | Status: COMPLETED | OUTPATIENT
Start: 2019-12-23 | End: 2019-12-23

## 2019-12-23 RX ADMIN — Medication 2 MILLIGRAM(S): at 17:42

## 2019-12-23 RX ADMIN — PREGABALIN 1000 MICROGRAM(S): 225 CAPSULE ORAL at 17:42

## 2019-12-23 RX ADMIN — Medication 20 MILLIEQUIVALENT(S): at 11:12

## 2019-12-23 RX ADMIN — Medication 2 MILLIGRAM(S): at 20:47

## 2019-12-23 RX ADMIN — ENOXAPARIN SODIUM 40 MILLIGRAM(S): 100 INJECTION SUBCUTANEOUS at 04:20

## 2019-12-23 RX ADMIN — Medication 100 MILLIGRAM(S): at 11:12

## 2019-12-23 RX ADMIN — PANTOPRAZOLE SODIUM 40 MILLIGRAM(S): 20 TABLET, DELAYED RELEASE ORAL at 07:57

## 2019-12-23 RX ADMIN — Medication 2 MILLIGRAM(S): at 04:19

## 2019-12-23 RX ADMIN — Medication 2 MILLIGRAM(S): at 14:19

## 2019-12-23 RX ADMIN — Medication 2 MILLIGRAM(S): at 10:17

## 2019-12-23 RX ADMIN — Medication 650 MILLIGRAM(S): at 04:19

## 2019-12-23 RX ADMIN — Medication 1 MILLIGRAM(S): at 11:12

## 2019-12-23 NOTE — PROGRESS NOTE ADULT - SUBJECTIVE AND OBJECTIVE BOX
SUBJECTIVE:    Patient is a 39y old  Female who presents with a chief complaint of Palpitation (22 Dec 2019 18:15)    Currently admitted to medicine with the primary diagnosis of Tachycardia     Today is hospital day 2d.     Patient was seen at bedside this AM.  Patient denies palpitations, chest pain, sob, headache, dizziness, nausea, vomiting.  Only complains of lower extremity pain that is sharp in nature and radiates from knee down to her toes.   No acute overnight events.     PAST MEDICAL & SURGICAL HISTORY  PAST MEDICAL & SURGICAL HISTORY:  Polysubstance dependence  SVT (supraventricular tachycardia)  H/O prior ablation treatment      ALLERGIES:  codeine (Rash)    MEDICATIONS:  STANDING MEDICATIONS  enoxaparin Injectable 40 milliGRAM(s) SubCutaneous every 24 hours  folic acid 1 milliGRAM(s) Oral daily  LORazepam     Tablet 2 milliGRAM(s) Oral every 4 hours  pantoprazole    Tablet 40 milliGRAM(s) Oral before breakfast  thiamine 100 milliGRAM(s) Oral daily    PRN MEDICATIONS  acetaminophen   Tablet .. 650 milliGRAM(s) Oral every 6 hours PRN  LORazepam     Tablet 1 milliGRAM(s) Oral every 2 hours PRN    VITALS:   T(F): 97.5  HR: 84  BP: 119/76  RR: 18  SpO2: 100%    LABS:                        12.9   5.34  )-----------( 169      ( 23 Dec 2019 11:38 )             38.1     12-23    141  |  101  |  10  ----------------------------<  109<H>  4.1   |  25  |  0.7    Ca    9.5      23 Dec 2019 11:38  Phos  3.4     12-23  Mg     1.9     12-23    TPro  6.8  /  Alb  4.3  /  TBili  0.5  /  DBili  x   /  AST  18  /  ALT  11  /  AlkPhos  61  12-23                  RADIOLOGY  < from: 12 Lead ECG (12.21.19 @ 06:13) >    Diagnosis Line Normal sinus rhythm  Low voltage QRS  Borderline ECG    < end of copied text >  < from: CT Abdomen and Pelvis No Cont (12.22.19 @ 08:13) >    IMPRESSION:   1.  Unremarkable noncontrast abdominopelvic CT.  2.  Specifically no retroperitoneal hematoma as clinically queried.    < end of copied text >    < from: [facility] Duplex Lower Ext Vein Scan, Bilat (12.21.19 @ 09:42) >  Impression:    No evidence of deep venous thrombosis or superficial thrombophlebitis in the bilateral lower extremities.    ICD-10:M79.604    < end of copied text >        PHYSICAL EXAM:  GEN: No acute distress.  No signs of withdrawal.  No signs of agitation, diaphoresis upon examination.   HEENT: NCAT. No JVD   LUNGS: Clear to auscultation bilaterally anteriorly and posteriorly   HEART: Normal S1/S2. No M/G/R  ABD: Soft, non-tender, non-distended. +BS on all four quadrants.   EXT: no edema bilaterally.   NEURO: AAOX3. No FND.     Assessment and Plan: SUBJECTIVE:    Patient is a 39y old  Female who presents with a chief complaint of Palpitation (22 Dec 2019 18:15)    Currently admitted to medicine with the primary diagnosis of Tachycardia     Today is hospital day 2d.     Patient was seen at bedside this AM.  Patient denies palpitations, chest pain, sob, headache, dizziness, nausea, vomiting.  Only complains of lower extremity pain that is sharp in nature and radiates from knee down to her toes.   No acute overnight events.     PAST MEDICAL & SURGICAL HISTORY  PAST MEDICAL & SURGICAL HISTORY:  Polysubstance dependence  SVT (supraventricular tachycardia)  H/O prior ablation treatment      ALLERGIES:  codeine (Rash)    MEDICATIONS:  STANDING MEDICATIONS  enoxaparin Injectable 40 milliGRAM(s) SubCutaneous every 24 hours  folic acid 1 milliGRAM(s) Oral daily  LORazepam     Tablet 2 milliGRAM(s) Oral every 4 hours  pantoprazole    Tablet 40 milliGRAM(s) Oral before breakfast  thiamine 100 milliGRAM(s) Oral daily    PRN MEDICATIONS  acetaminophen   Tablet .. 650 milliGRAM(s) Oral every 6 hours PRN  LORazepam     Tablet 1 milliGRAM(s) Oral every 2 hours PRN    VITALS:   T(F): 97.5  HR: 84  BP: 119/76  RR: 18  SpO2: 100%    LABS:                        12.9   5.34  )-----------( 169      ( 23 Dec 2019 11:38 )             38.1     12-23    141  |  101  |  10  ----------------------------<  109<H>  4.1   |  25  |  0.7    Ca    9.5      23 Dec 2019 11:38  Phos  3.4     12-23  Mg     1.9     12-23    TPro  6.8  /  Alb  4.3  /  TBili  0.5  /  DBili  x   /  AST  18  /  ALT  11  /  AlkPhos  61  12-23                  RADIOLOGY  < from: 12 Lead ECG (12.21.19 @ 06:13) >    Diagnosis Line Normal sinus rhythm  Low voltage QRS  Borderline ECG    < end of copied text >  < from: 12 Lead ECG (12.20.19 @ 21:31) >    Diagnosis Line Sinus tachycardia  Low voltage QRS  Nonspecific ST and T wave abnormality  Abnormal ECG    < end of copied text >  < from: CT Abdomen and Pelvis No Cont (12.22.19 @ 08:13) >    IMPRESSION:   1.  Unremarkable noncontrast abdominopelvic CT.  2.  Specifically no retroperitoneal hematoma as clinically queried.    < end of copied text >    < from: [facility] Duplex Lower Ext Vein Scan, Bilat (12.21.19 @ 09:42) >  Impression:    No evidence of deep venous thrombosis or superficial thrombophlebitis in the bilateral lower extremities.    ICD-10:M79.604    < end of copied text >            PHYSICAL EXAM:  GEN: No acute distress.  No signs of withdrawal.  No signs of agitation, diaphoresis upon examination.   HEENT: NCAT. No JVD   LUNGS: Clear to auscultation bilaterally anteriorly and posteriorly   HEART: Normal S1/S2. No M/G/R., Tachycardia   ABD: Soft, non-tender, non-distended. +BS on all four quadrants.   EXT: no edema bilaterally.   NEURO: AAOX3. No FND.     Assessment and Plan  A 38 yo F with PMHx of  SVT s/p 4 failed ablations, scoliosis, alcoholism w/ prior withdrawal symptoms (DTs), and Multiple drug abuse sent from PMD office for tachycardia up to 160's.      #Palpitations  Initial ECG demonstrated Sinus Tachycardia   Patient never desaturated-->Less likely PE.   Duplex LE demonstrated no DVT   HR today 97-->Much more controlled than previously.  Currently on PO ativan 2mg [id number] standing and PO ativan 1mg PRN.  Will hold off on cardizem for now.   CIWA score 0 today.   EP--Get Echo to r/o cardiomyopathy.  Echo pending   Cardiac enzymes have been negative x2.    CXR: unremarkable  Avoid BB as use can cause uncontrolled alpha receptor response, significantly increasing BP.   C/w tele       # Puffy face  Had 2 steroid injections in the back for chronic back pain  Cortisol low 0.8, no electrolyte abnormalities. Na->141, K-4.1--->Does not suggest any adrenal abnormalities.   TSH normal   Non-remarkable contrast CT of the A+P.     # Alcohol abuse with 1L vodka daily   No signs of ETOH withdrawal.  No agitation, tremors, hypertension.   CIWA score is 0 today.     # Magnesium def  Magnesium within normal limits at 1.9  Will replete as needed.     # Drug abuse  - urine + for methadone, cocaine and percocet  - Gets methadone on the streets   - F/u Addiction medicine c/s (Dr. Atif Jurado) for detox     DVT ppx: Lovenox  GI ppx: Protonix  Diet: Regular   Activity: Increase as tolerated   Dispo: pending

## 2019-12-24 LAB
ALBUMIN SERPL ELPH-MCNC: 4.3 G/DL — SIGNIFICANT CHANGE UP (ref 3.5–5.2)
ALP SERPL-CCNC: 57 U/L — SIGNIFICANT CHANGE UP (ref 30–115)
ALT FLD-CCNC: 10 U/L — SIGNIFICANT CHANGE UP (ref 0–41)
ANION GAP SERPL CALC-SCNC: 13 MMOL/L — SIGNIFICANT CHANGE UP (ref 7–14)
AST SERPL-CCNC: 15 U/L — SIGNIFICANT CHANGE UP (ref 0–41)
BASOPHILS # BLD AUTO: 0.03 K/UL — SIGNIFICANT CHANGE UP (ref 0–0.2)
BASOPHILS NFR BLD AUTO: 0.4 % — SIGNIFICANT CHANGE UP (ref 0–1)
BILIRUB SERPL-MCNC: 0.4 MG/DL — SIGNIFICANT CHANGE UP (ref 0.2–1.2)
BUN SERPL-MCNC: 11 MG/DL — SIGNIFICANT CHANGE UP (ref 10–20)
CALCIUM SERPL-MCNC: 9.2 MG/DL — SIGNIFICANT CHANGE UP (ref 8.5–10.1)
CHLORIDE SERPL-SCNC: 101 MMOL/L — SIGNIFICANT CHANGE UP (ref 98–110)
CO2 SERPL-SCNC: 22 MMOL/L — SIGNIFICANT CHANGE UP (ref 17–32)
CREAT SERPL-MCNC: 0.7 MG/DL — SIGNIFICANT CHANGE UP (ref 0.7–1.5)
CULTURE RESULTS: SIGNIFICANT CHANGE UP
EOSINOPHIL # BLD AUTO: 0.14 K/UL — SIGNIFICANT CHANGE UP (ref 0–0.7)
EOSINOPHIL NFR BLD AUTO: 1.8 % — SIGNIFICANT CHANGE UP (ref 0–8)
FOLATE RBC-MCNC: 1355 NG/ML — SIGNIFICANT CHANGE UP (ref 499–1504)
GLUCOSE SERPL-MCNC: 104 MG/DL — HIGH (ref 70–99)
HCT VFR BLD CALC: 39 % — SIGNIFICANT CHANGE UP (ref 37–47)
HGB BLD-MCNC: 13.4 G/DL — SIGNIFICANT CHANGE UP (ref 12–16)
IMM GRANULOCYTES NFR BLD AUTO: 0.4 % — HIGH (ref 0.1–0.3)
LYMPHOCYTES # BLD AUTO: 2.19 K/UL — SIGNIFICANT CHANGE UP (ref 1.2–3.4)
LYMPHOCYTES # BLD AUTO: 28.6 % — SIGNIFICANT CHANGE UP (ref 20.5–51.1)
MAGNESIUM SERPL-MCNC: 1.9 MG/DL — SIGNIFICANT CHANGE UP (ref 1.8–2.4)
MCHC RBC-ENTMCNC: 34.2 PG — HIGH (ref 27–31)
MCHC RBC-ENTMCNC: 34.4 G/DL — SIGNIFICANT CHANGE UP (ref 32–37)
MCV RBC AUTO: 99.5 FL — HIGH (ref 81–99)
MONOCYTES # BLD AUTO: 0.51 K/UL — SIGNIFICANT CHANGE UP (ref 0.1–0.6)
MONOCYTES NFR BLD AUTO: 6.7 % — SIGNIFICANT CHANGE UP (ref 1.7–9.3)
NEUTROPHILS # BLD AUTO: 4.75 K/UL — SIGNIFICANT CHANGE UP (ref 1.4–6.5)
NEUTROPHILS NFR BLD AUTO: 62.1 % — SIGNIFICANT CHANGE UP (ref 42.2–75.2)
NRBC # BLD: 0 /100 WBCS — SIGNIFICANT CHANGE UP (ref 0–0)
PLATELET # BLD AUTO: 184 K/UL — SIGNIFICANT CHANGE UP (ref 130–400)
POTASSIUM SERPL-MCNC: 3.9 MMOL/L — SIGNIFICANT CHANGE UP (ref 3.5–5)
POTASSIUM SERPL-SCNC: 3.9 MMOL/L — SIGNIFICANT CHANGE UP (ref 3.5–5)
PROT SERPL-MCNC: 7 G/DL — SIGNIFICANT CHANGE UP (ref 6–8)
RBC # BLD: 3.92 M/UL — LOW (ref 4.2–5.4)
RBC # FLD: 13.4 % — SIGNIFICANT CHANGE UP (ref 11.5–14.5)
SODIUM SERPL-SCNC: 136 MMOL/L — SIGNIFICANT CHANGE UP (ref 135–146)
SPECIMEN SOURCE: SIGNIFICANT CHANGE UP
WBC # BLD: 7.65 K/UL — SIGNIFICANT CHANGE UP (ref 4.8–10.8)
WBC # FLD AUTO: 7.65 K/UL — SIGNIFICANT CHANGE UP (ref 4.8–10.8)

## 2019-12-24 PROCEDURE — 99233 SBSQ HOSP IP/OBS HIGH 50: CPT

## 2019-12-24 PROCEDURE — 93010 ELECTROCARDIOGRAM REPORT: CPT

## 2019-12-24 RX ORDER — DILTIAZEM HCL 120 MG
30 CAPSULE, EXT RELEASE 24 HR ORAL EVERY 6 HOURS
Refills: 0 | Status: DISCONTINUED | OUTPATIENT
Start: 2019-12-24 | End: 2019-12-25

## 2019-12-24 RX ORDER — SODIUM CHLORIDE 9 MG/ML
1000 INJECTION INTRAMUSCULAR; INTRAVENOUS; SUBCUTANEOUS
Refills: 0 | Status: DISCONTINUED | OUTPATIENT
Start: 2019-12-24 | End: 2019-12-25

## 2019-12-24 RX ORDER — SODIUM CHLORIDE 9 MG/ML
1000 INJECTION, SOLUTION INTRAVENOUS
Refills: 0 | Status: DISCONTINUED | OUTPATIENT
Start: 2019-12-24 | End: 2019-12-25

## 2019-12-24 RX ORDER — LANOLIN ALCOHOL/MO/W.PET/CERES
5 CREAM (GRAM) TOPICAL AT BEDTIME
Refills: 0 | Status: DISCONTINUED | OUTPATIENT
Start: 2019-12-24 | End: 2019-12-25

## 2019-12-24 RX ADMIN — Medication 30 MILLIGRAM(S): at 12:00

## 2019-12-24 RX ADMIN — Medication 1 MILLIGRAM(S): at 19:46

## 2019-12-24 RX ADMIN — PANTOPRAZOLE SODIUM 40 MILLIGRAM(S): 20 TABLET, DELAYED RELEASE ORAL at 06:25

## 2019-12-24 RX ADMIN — Medication 2 MILLIGRAM(S): at 09:34

## 2019-12-24 RX ADMIN — Medication 30 MILLIGRAM(S): at 17:46

## 2019-12-24 RX ADMIN — Medication 2 MILLIGRAM(S): at 06:25

## 2019-12-24 RX ADMIN — Medication 2 MILLIGRAM(S): at 02:16

## 2019-12-24 RX ADMIN — Medication 1 MILLIGRAM(S): at 17:45

## 2019-12-24 RX ADMIN — Medication 1 MILLIGRAM(S): at 23:44

## 2019-12-24 RX ADMIN — Medication 5 MILLIGRAM(S): at 21:31

## 2019-12-24 RX ADMIN — Medication 100 MILLIGRAM(S): at 11:58

## 2019-12-24 RX ADMIN — Medication 1 MILLIGRAM(S): at 11:58

## 2019-12-24 RX ADMIN — PREGABALIN 1000 MICROGRAM(S): 225 CAPSULE ORAL at 11:59

## 2019-12-24 RX ADMIN — Medication 30 MILLIGRAM(S): at 23:45

## 2019-12-24 RX ADMIN — SODIUM CHLORIDE 100 MILLILITER(S): 9 INJECTION INTRAMUSCULAR; INTRAVENOUS; SUBCUTANEOUS at 13:56

## 2019-12-24 NOTE — PROGRESS NOTE ADULT - ATTENDING COMMENTS
I saw and examined the patient independently. I agree with above history, physical exam and plan of care which I have reviewed and edited where appropriate with following additions.     patient awake, alert/ concerned about rapid HR/ advised patient to avoid ETOH / polysubstance abuse.    sinus tachycardia likely due to ETOH/polysubstance abuse withdrawal with h/o SVT sp failure of ablation x4:   c/w telemonitoring/HR still fast on tele upto 120's.   EP evaluated initially- suggest no further lewis except for TTE.   TTE. normal / no structural or valvular abnormality noted.   completed CIWA ptotocol  c/w ativan prn   start cardizem 30mg q6hr / avoid BB/ recent use of cocaine.   start IVF hydration - monitor response on HR    ETOH withdrawal:   completed CiWA protocol /reportedly CIWA score is around 3 now.  ativan PRn   fu addiction consult for detox.   resident tried to reach methadone clinic - patient has prolonged QTc /methadone clinic will not start methadone with abnormal QTc  monitor and replete electrolytes/ keep K>4 and mag >2.  repeat EKG after better HR control improvement of QTc and contact back methadone clinic once QTc improves.   re-enforced avoiding polysubstance abuse to patient.     low cortisol level/low suspicion for adrenal insufficiency:   no symptoms correlating with insufficiency/ no electrolyte abnormalities.  CT abd/pelvis : unremarkable    suspected thiamine/folate def:   c/w thiamin / folate supplements.    suspected mag def:   better after repletion / replte prn to keep >2.    dvt ppx     Progress note Handoff:   Pending: improvement of tachycardia/ Addiction consult  Discussion: plan of care with  patient /  satisfied- all questions answered.  Disposition: unknown at this time.
I saw and examined the patient independently. I agree with above history, physical exam and plan of care which I have reviewed and edited where appropriate with following additions.     patient more awake alert today morning/ concerned about her fast HR/explained related to polysubstance abuse.     sinus tachycardia likely due to ETOH/polysubstance abuse withdrawal with h/o SVT sp failure of ablation x4:   telemonitoring.   EP evaluated- suggest no further lewis except for TTE.   TTE. pending  cw ativan protocol for ETOH withdrawal.    ETOH withdrawal:   c/w CiWA protocol with ativan .    fu addiction consult for detox.   avoid BB with recent cocaine abuse.     low cortisol level/low suspicion for adrenal insufficiency:   no symptoms correlating with insufficiency/ no electrolyte abnormalities.  CT abd/pelvis : unremarkable      suspected thiamine/folate def:   c/w thiamin / folate supplements.    suspected mag def:   better after repletion / replte prn to keep >2.    dvt ppx     Progress note Handoff:   Pending: improvement of ETOH withdrawal/ TTE/ Addiction consult  Discussion: plan of care with  patient /  satisfied- all questions answered.  Disposition: unknown at this time.
I saw and examined the patient independently. I agree with above history, physical exam and plan of care which I have reviewed and edited where appropriate with following additions.     patient sleepy after receiving ativan in the morning/ arousable to vocal command though. fiance at bedside.     sinus tachycardia likely due to ETOH/polysubstance abuse withdrawal with h/o SVT sp failure of ablation x4:   telemonitoring.   EP evaluated- suggest no further lewis except for TTE.   TTE. pending  cw ativan protocol for ETOH withdrawal.    ETOH withdrawal:   c/w CiWA protocol with ativan .  received ativan twice since 4 am today.    fu addiction consult. spoke to munira at bedside- reports patient want to be detoxed again   avoid BB with recent cocaine abuse.     low cortisol level/low suspicion for adrenal insufficiency:   no symptoms correlating with insufficiency/ no electrolyte abnormalities.  fu CT abd/pelvis for adrenal pathology      suspected thiamine/folate def:   c/w thiamin / folate supplements.    suspected mag def:   better after repletion / replte prn to keep >2.    dvt ppx     Progress note Handoff:   Pending: improvement of ETOH withdrawal/ TTE/ Addiction consult  Discussion: plan of care with  patient /  satisfied- all questions answered.  Disposition: unknown at this time

## 2019-12-24 NOTE — PROGRESS NOTE ADULT - SUBJECTIVE AND OBJECTIVE BOX
SUBJECTIVE:    Patient is a 39y old  Female who presents with a chief complaint of Palpitation (23 Dec 2019 13:25)    Currently admitted to medicine with the primary diagnosis of Tachycardia     Today is hospital day 3d.       Patient was seen at bedside this AM.  She denies chest pain, shortness of breath, palpitations. She is not tremulous on observation.  CIWA 3.  Still persistently tachycardic.  No acute overnight events.     PAST MEDICAL & SURGICAL HISTORY  PAST MEDICAL & SURGICAL HISTORY:  Polysubstance dependence  SVT (supraventricular tachycardia)  H/O prior ablation treatment      ALLERGIES:  codeine (Rash)    MEDICATIONS:  STANDING MEDICATIONS  cyanocobalamin Injectable 1000 MICROGram(s) SubCutaneous daily  diltiazem    Tablet 30 milliGRAM(s) Oral every 6 hours  enoxaparin Injectable 40 milliGRAM(s) SubCutaneous every 24 hours  folic acid 1 milliGRAM(s) Oral daily  lactated ringers. 1000 milliLiter(s) IV Continuous <Continuous>  pantoprazole    Tablet 40 milliGRAM(s) Oral before breakfast  sodium chloride 0.9%. 1000 milliLiter(s) IV Continuous <Continuous>  thiamine 100 milliGRAM(s) Oral daily    PRN MEDICATIONS  acetaminophen   Tablet .. 650 milliGRAM(s) Oral every 6 hours PRN  LORazepam     Tablet 1 milliGRAM(s) Oral every 2 hours PRN    VITALS:   T(F): 97.8  HR: 101  BP: 106/61  RR: 20  SpO2: 98%    LABS:                        13.4   7.65  )-----------( 184      ( 24 Dec 2019 08:02 )             39.0     12-24    136  |  101  |  11  ----------------------------<  104<H>  3.9   |  22  |  0.7    Ca    9.2      24 Dec 2019 08:02  Phos  3.4     12-23  Mg     1.9     12-24    TPro  7.0  /  Alb  4.3  /  TBili  0.4  /  DBili  x   /  AST  15  /  ALT  10  /  AlkPhos  57  12-24              GI PCR Panel, Stool (collected 23 Dec 2019 15:20)  Source: .Stool Feces  Final Report (24 Dec 2019 05:30):    GI PCR Results: NOT detected    *******Please Note:*******    GI panel PCR evaluates for:    Campylobacter, Plesiomonas shigelloides, Salmonella,    Vibrio, Yersinia enterocolitica, Enteroaggregative    Escherichia coli (EAEC), Enteropathogenic E.coli (EPEC),    Enterotoxigenic E. coli (ETEC) lt/st, Shiga-like    toxin-producing E. coli (STEC) stx1/stx2,    Shigella/ Enteroinvasive E. coli (EIEC), Cryptosporidium,    Cyclospora cayetanensis, Entamoeba histolytica,    Giardia lamblia, Adenovirus F 40/41, Astrovirus,    Norovirus GI/GII, Rotavirus A, Sapovirus        RADIOLOGY  < from: 12 Lead ECG (12.21.19 @ 06:13) >    Diagnosis Line Normal sinus rhythm  Low voltage QRS  Borderline ECG    < end of copied text >  < from: 12 Lead ECG (12.20.19 @ 21:31) >    Diagnosis Line Sinus tachycardia  Low voltage QRS  Nonspecific ST and T wave abnormality  Abnormal ECG    < end of copied text >  < from: CT Abdomen and Pelvis No Cont (12.22.19 @ 08:13) >    IMPRESSION:   1.  Unremarkable noncontrast abdominopelvic CT.  2.  Specifically no retroperitoneal hematoma as clinically queried.    < end of copied text >    < from: VA Duplex Lower Ext Vein Scan, Bilat (12.21.19 @ 09:42) >  Impression:    No evidence of deep venous thrombosis or superficial thrombophlebitis in the bilateral lower extremities.    ICD-10:M79.604    < end of copied text >            PHYSICAL EXAM:  GEN: No acute distress.  No signs of withdrawal.  No signs of agitation, diaphoresis upon examination.   HEENT: NCAT. No JVD   LUNGS: Clear to auscultation bilaterally anteriorly and posteriorly   HEART: Normal S1/S2. No M/G/R., Tachycardia   ABD: Soft, non-tender, non-distended. +BS on all four quadrants.   EXT: no edema bilaterally.   NEURO: AAOX3. No FND.     Assessment and Plan  A 40 yo F with PMHx of  SVT s/p 4 failed ablations, scoliosis, alcoholism w/ prior withdrawal symptoms (DTs), and Multiple drug abuse sent from PMD office for tachycardia up to 160's.      #Palpitations  Initial ECG demonstrated Sinus Tachycardia   Patient never desaturated-->Less likely PE.   Duplex LE demonstrated no DVT   Standing ordered of ativan discontinued.  Patient currently ativan 1mg q4 PRN so that ativan regimen can be tapered.    Cardizem 30mg PO q0tejsu started due to persistent tachycardia   CIWA score 3 today.   Echo-->60-65% EF with normal LV global systolic function.    Cardiac enzymes have been negative x2.    CXR: unremarkable  Avoid BB as use can cause uncontrolled alpha receptor response, significantly increasing BP.   C/w tele       # Puffy face  Had 2 steroid injections in the back for chronic back pain  Cortisol low 0.8, no electrolyte abnormalities. Na->141, K-4.1--->Does not suggest any adrenal abnormalities.   TSH normal   Non-remarkable contrast CT of the A+P.     # Alcohol abuse with 1L vodka daily   Patient exhibits no signs of ETOH withdrawal-->No tremors, hypertension, agitation.  Only demonstrates tachycardia  Cardizem regimen started.   Will start tapering down on ativan today due to low CHITAR score.     # Magnesium def  Mag 1.9 today.     # Drug abuse  - urine + for methadone, cocaine and percocet on 10/1/2019  Will get another urine tox today.   - Gets methadone on the streets   - Called methadone clinic-->Needs cardiology clearance.  Most recent QTc-->458.  May not be able to get methadone due to increased QT interval.       DVT ppx: Lovenox  GI ppx: Protonix  Diet: Regular   Activity: Increase as tolerated   Dispo: pending

## 2019-12-25 ENCOUNTER — TRANSCRIPTION ENCOUNTER (OUTPATIENT)
Age: 39
End: 2019-12-25

## 2019-12-25 VITALS
TEMPERATURE: 98 F | DIASTOLIC BLOOD PRESSURE: 75 MMHG | HEART RATE: 89 BPM | SYSTOLIC BLOOD PRESSURE: 114 MMHG | OXYGEN SATURATION: 100 % | RESPIRATION RATE: 19 BRPM

## 2019-12-25 PROCEDURE — 99239 HOSP IP/OBS DSCHRG MGMT >30: CPT

## 2019-12-25 RX ORDER — PREGABALIN 225 MG/1
1 CAPSULE ORAL
Qty: 30 | Refills: 0
Start: 2019-12-25 | End: 2020-01-23

## 2019-12-25 RX ORDER — DILTIAZEM HCL 120 MG
1 CAPSULE, EXT RELEASE 24 HR ORAL
Qty: 90 | Refills: 0
Start: 2019-12-25 | End: 2020-01-23

## 2019-12-25 RX ORDER — FOLIC ACID 0.8 MG
1 TABLET ORAL
Qty: 30 | Refills: 0
Start: 2019-12-25 | End: 2020-01-23

## 2019-12-25 RX ORDER — DILTIAZEM HCL 120 MG
1 CAPSULE, EXT RELEASE 24 HR ORAL
Qty: 120 | Refills: 0
Start: 2019-12-25 | End: 2020-01-23

## 2019-12-25 RX ORDER — THIAMINE MONONITRATE (VIT B1) 100 MG
1 TABLET ORAL
Qty: 30 | Refills: 0
Start: 2019-12-25 | End: 2020-01-23

## 2019-12-25 RX ORDER — METHADONE HYDROCHLORIDE 40 MG/1
0 TABLET ORAL
Qty: 0 | Refills: 0 | DISCHARGE

## 2019-12-25 RX ADMIN — PANTOPRAZOLE SODIUM 40 MILLIGRAM(S): 20 TABLET, DELAYED RELEASE ORAL at 06:20

## 2019-12-25 RX ADMIN — Medication 30 MILLIGRAM(S): at 12:04

## 2019-12-25 RX ADMIN — Medication 30 MILLIGRAM(S): at 05:22

## 2019-12-25 RX ADMIN — ENOXAPARIN SODIUM 40 MILLIGRAM(S): 100 INJECTION SUBCUTANEOUS at 05:22

## 2019-12-25 RX ADMIN — PREGABALIN 1000 MICROGRAM(S): 225 CAPSULE ORAL at 12:05

## 2019-12-25 RX ADMIN — Medication 1 MILLIGRAM(S): at 12:04

## 2019-12-25 RX ADMIN — Medication 100 MILLIGRAM(S): at 12:04

## 2019-12-25 NOTE — CONSULT NOTE ADULT - SUBJECTIVE AND OBJECTIVE BOX
Patient is a 39y old  Female who presents with a chief complaint of Palpitation (25 Dec 2019 09:59)      HPI:  40 yo F with PMH of SVT s/p 4 failed ablations, scoliosis, alcoholism w/ prior withdrawal symptoms (DTs), and Multiple drug abuse presenting to ED with tachycardia up to 160's found at her primary doctor office.  Patient was sent by PCP Dr. Marcano due to concern for tachycardia.  Patient also states she has had R foot numbness/tingling x 2-3 weeks (no injuries/trauma, no focal deficits, walking without difficulty). Denies any vision changes, Headache, neck pain, back pain, Chest pain, SOB, abd pain, nausea/vomiting/diarrhea, rashes, or injuries/traumas/falls.   Ablation was done in [place] 2 times and [place] 2 times and failed and was told that she may need PPM. She has been to detox twice in the past. Last used cocaine 2 days ago, snorts, no IVDU or additional drug use. Last drink was a few hours prior to arrival to ED, drinks vodka daily, up to 1L. Has been drinking daily since 05/19. Primary doctor was concerned about possible clot although patient has no history of DVT/PE, no calf swelling, no shortness of breath, and no recent surgeries/immobilizations.    She also reports that she has noticed swelling in her face for last 2 weeks  She also has bruise in her left flank she she got when she accidentally hit by car door while closing it. (21 Dec 2019 04:34)      PAST MEDICAL & SURGICAL HISTORY:  Polysubstance dependence  SVT (supraventricular tachycardia)  H/O prior ablation treatment      PREVIOUS DIAGNOSTIC TESTING:      ECHO  FINDINGS:    STRESS  FINDINGS:    CATHETERIZATION  FINDINGS:    MEDICATIONS  (STANDING):  cyanocobalamin Injectable 1000 MICROGram(s) SubCutaneous daily  diltiazem    Tablet 30 milliGRAM(s) Oral every 6 hours  enoxaparin Injectable 40 milliGRAM(s) SubCutaneous every 24 hours  folic acid 1 milliGRAM(s) Oral daily  lactated ringers. 1000 milliLiter(s) (100 mL/Hr) IV Continuous <Continuous>  melatonin 5 milliGRAM(s) Oral at bedtime  pantoprazole    Tablet 40 milliGRAM(s) Oral before breakfast  sodium chloride 0.9%. 1000 milliLiter(s) (100 mL/Hr) IV Continuous <Continuous>  thiamine 100 milliGRAM(s) Oral daily    MEDICATIONS  (PRN):  acetaminophen   Tablet .. 650 milliGRAM(s) Oral every 6 hours PRN Moderate Pain (4 - 6)  LORazepam     Tablet 1 milliGRAM(s) Oral every 2 hours PRN Agitation      FAMILY HISTORY:      SOCIAL HISTORY:  CIGARETTES:    ALCOHOL:    REVIEW OF SYSTEMS:  CONSTITUTIONAL: No fever, weight loss, or fatigue  NECK: No pain or stiffness  RESPIRATORY: No cough, wheezing, chills or hemoptysis; No shortness of breath  CARDIOVASCULAR: No chest pain, palpitations, dizziness, or leg swelling  GASTROINTESTINAL: No abdominal or epigastric pain. No nausea, vomiting, or hematemesis; No diarrhea or constipation. No melena or hematochezia.  GENITOURINARY: No dysuria, frequency, hematuria, or incontinence  NEUROLOGICAL: No headaches, memory loss, loss of strength, numbness, or tremors  SKIN: No itching, burning, rashes, or lesions   ENDOCRINE: No heat or cold intolerance; No hair loss  MUSCULOSKELETAL: No joint pain or swelling; No muscle, back, or extremity pain  HEME/LYMPH: No easy bruising, or bleeding gums          Vital Signs Last 24 Hrs  T(C): 36.7 (25 Dec 2019 07:05), Max: 37.3 (24 Dec 2019 20:51)  T(F): 98 (25 Dec 2019 07:05), Max: 99.2 (24 Dec 2019 20:51)  HR: 89 (25 Dec 2019 07:05) (89 - 116)  BP: 114/75 (25 Dec 2019 07:05) (113/67 - 118/79)  BP(mean): 86 (24 Dec 2019 14:25) (86 - 86)  RR: 19 (25 Dec 2019 07:05) (19 - 20)  SpO2: 100% (25 Dec 2019 07:05) (100% - 100%)        PHYSICAL EXAM:  GENERAL: NAD, well-groomed, well-developed  HEAD:  Atraumatic, Normocephalic  NECK: Supple, No JVD, Normal thyroid  NERVOUS SYSTEM:  Alert & Oriented X3, Good concentration  CHEST/LUNG: Clear to percussion bilaterally; No rales, rhonchi, wheezing, or rubs  HEART: Regular rate and rhythm; No murmurs, rubs, or gallops  ABDOMEN: Soft, Nontender, Nondistended; Bowel sounds present  EXTREMITIES:  2+ Peripheral Pulses, No clubbing, cyanosis, or edema  SKIN: No rashes or lesions    INTERPRETATION OF TELEMETRY:    ECG:    I&O's Detail    24 Dec 2019 07:01  -  25 Dec 2019 07:00  --------------------------------------------------------  IN:    sodium chloride 0.9%.: 1600 mL  Total IN: 1600 mL    OUT:  Total OUT: 0 mL    Total NET: 1600 mL          LABS:                        13.4   7.65  )-----------( 184      ( 24 Dec 2019 08:02 )             39.0     12-24    136  |  101  |  11  ----------------------------<  104<H>  3.9   |  22  |  0.7    Ca    9.2      24 Dec 2019 08:02  Phos  3.4     12-23  Mg     1.9     12-24    TPro  7.0  /  Alb  4.3  /  TBili  0.4  /  DBili  x   /  AST  15  /  ALT  10  /  AlkPhos  57  12-24            I&O's Summary    24 Dec 2019 07:01  -  25 Dec 2019 07:00  --------------------------------------------------------  IN: 1600 mL / OUT: 0 mL / NET: 1600 mL        RADIOLOGY & ADDITIONAL STUDIES:

## 2019-12-25 NOTE — DISCHARGE NOTE PROVIDER - CARE PROVIDER_API CALL
Wade Marcano)  Internal Medicine  [address] [place] [address]  Phone: [phone]  Fax: [phone]  Follow Up Time: 1 week

## 2019-12-25 NOTE — CONSULT NOTE ADULT - ASSESSMENT
history  ETOH   drug  abuse    presented with sinus tach      EPS  consult noted   consulted to   eval QT  interval  which is  normal

## 2019-12-25 NOTE — DISCHARGE NOTE PROVIDER - NSDCCPCAREPLAN_GEN_ALL_CORE_FT
PRINCIPAL DISCHARGE DIAGNOSIS  Diagnosis: Tachycardia  Assessment and Plan of Treatment: You were admitted because you had a very high heart rate.  You were seen by a cardiac electrophysiologist, who recommended an echo.  The echocardiogram was normal.  In an effort to control your heart, you will be started on cardizem 60mg by mouth every 8 hours.  This medication will slow down your heart rate and prevent it from going too fast.  Please follow up with your primary care doctor      SECONDARY DISCHARGE DIAGNOSES  Diagnosis: History of cocaine abuse  Assessment and Plan of Treatment: You have a history of cocaine abuse.  Please refrain from using cocaine.  You are encouraged to join support groups in helping you cope with your addiction.  Please follow up with your primary care doctor with options on how to manage this.  Please refrain from using cocaine as this may also lead to a fast heart rate.    Diagnosis: Alcoholism  Assessment and Plan of Treatment: You have a history of alcohol abuse.  You have options of joining support groups to help you cope with your addiction.  Please follow up with your primary care doctor. Alcohol abuse can lead to deficiencies in certain vitamins like B1 and folate, you will be sent prescriptions of both. If you find yourself having tremors, headache, nausea, vomiting, fast heart rate, please report to the ED. PRINCIPAL DISCHARGE DIAGNOSIS  Diagnosis: Supraventricular tachycardia  Assessment and Plan of Treatment: You were admitted because you had a very high heart rate.  You were seen by a cardiac electrophysiologist, who recommended an echo.  The echocardiogram was normal.  In an effort to control your heart, you will be started on cardizem 60mg by mouth every 8 hours.  This medication will slow down your heart rate and prevent it from going too fast.  Please follow up with your primary care doctor      SECONDARY DISCHARGE DIAGNOSES  Diagnosis: History of cocaine abuse  Assessment and Plan of Treatment: You have a history of cocaine abuse.  Please refrain from using cocaine.  You are encouraged to join support groups in helping you cope with your addiction.  Please follow up with your primary care doctor with options on how to manage this.  Please refrain from using cocaine as this may also lead to a fast heart rate.    Diagnosis: Alcoholism  Assessment and Plan of Treatment: You have a history of alcohol abuse.  You have options of joining support groups to help you cope with your addiction.  Please follow up with your primary care doctor. Alcohol abuse can lead to deficiencies in certain vitamins like B1 and folate, you will be sent prescriptions of both. If you find yourself having tremors, headache, nausea, vomiting, fast heart rate, please report to the ED. PRINCIPAL DISCHARGE DIAGNOSIS  Diagnosis: Supraventricular tachycardia  Assessment and Plan of Treatment: You were admitted because you had a very high heart rate.  You were seen by a cardiac electrophysiologist, who recommended an echo.  The echocardiogram was normal.  In an effort to control your heart, you will be started on cardizem 30mg by mouth every 6 hours.  This medication will slow down your heart rate and prevent it from going too fast.  Please follow up with your primary care doctor      SECONDARY DISCHARGE DIAGNOSES  Diagnosis: History of cocaine abuse  Assessment and Plan of Treatment: You have a history of cocaine abuse.  Please refrain from using cocaine.  You are encouraged to join support groups in helping you cope with your addiction.  Please follow up with your primary care doctor with options on how to manage this.  Please refrain from using cocaine as this may also lead to a fast heart rate.    Diagnosis: Alcoholism  Assessment and Plan of Treatment: You have a history of alcohol abuse.  You have options of joining support groups to help you cope with your addiction.  Please follow up with your primary care doctor. Alcohol abuse can lead to deficiencies in certain vitamins like B1 and folate, you will be sent prescriptions of both. If you find yourself having tremors, headache, nausea, vomiting, fast heart rate, please report to the ED.

## 2019-12-25 NOTE — DISCHARGE NOTE PROVIDER - NSDCMRMEDTOKEN_GEN_ALL_CORE_FT
methadone: from the streets  Percocet 10/325: B-12 1000 mcg oral tablet: 1 tab(s) orally once a day  dilTIAZem 30 mg oral tablet: 1 tab(s) orally every 6 hours  folic acid 1 mg oral tablet: 1 tab(s) orally once a day  thiamine 100 mg oral tablet: 1 tab(s) orally once a day

## 2019-12-25 NOTE — DISCHARGE NOTE PROVIDER - CARE PROVIDERS DIRECT ADDRESSES
[email or web address]

## 2019-12-25 NOTE — DISCHARGE NOTE NURSING/CASE MANAGEMENT/SOCIAL WORK - PATIENT PORTAL LINK FT
You can access the FollowMyHealth Patient Portal offered by [facility] by registering at the following website: [email or web address]. By joining [employer]’s FollowMyHealth portal, you will also be able to view your health information using other applications (apps) compatible with our system.

## 2020-01-10 DIAGNOSIS — Z71.51 DRUG ABUSE COUNSELING AND SURVEILLANCE OF DRUG ABUSER: ICD-10-CM
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
## 2020-01-10 DIAGNOSIS — R00.0 TACHYCARDIA, UNSPECIFIED: ICD-10-CM

## 2020-01-10 DIAGNOSIS — I47.1 SUPRAVENTRICULAR TACHYCARDIA: ICD-10-CM
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
## 2020-01-10 DIAGNOSIS — E53.8 DEFICIENCY OF OTHER SPECIFIED B GROUP VITAMINS: ICD-10-CM
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
## 2020-01-10 DIAGNOSIS — G89.29 OTHER CHRONIC PAIN: ICD-10-CM

## 2020-01-10 DIAGNOSIS — F14.23 COCAINE DEPENDENCE WITH WITHDRAWAL: ICD-10-CM

## 2020-01-10 DIAGNOSIS — M54.9 DORSALGIA, UNSPECIFIED: ICD-10-CM

## 2020-01-10 DIAGNOSIS — Z88.5 ALLERGY STATUS TO NARCOTIC AGENT: ICD-10-CM

## 2020-01-10 DIAGNOSIS — Z71.41 ALCOHOL ABUSE COUNSELING AND SURVEILLANCE OF ALCOHOLIC: ICD-10-CM

## 2020-01-10 DIAGNOSIS — M41.9 SCOLIOSIS, UNSPECIFIED: ICD-10-CM

## 2020-01-10 DIAGNOSIS — F10.239 ALCOHOL DEPENDENCE WITH WITHDRAWAL, UNSPECIFIED: ICD-10-CM
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
## 2020-01-10 DIAGNOSIS — E83.42 HYPOMAGNESEMIA: ICD-10-CM

## 2020-01-10 DIAGNOSIS — F11.23 OPIOID DEPENDENCE WITH WITHDRAWAL: ICD-10-CM

## 2020-01-10 DIAGNOSIS — E51.9 THIAMINE DEFICIENCY, UNSPECIFIED: ICD-10-CM

## 2020-01-10 DIAGNOSIS — F10.229 ALCOHOL DEPENDENCE WITH INTOXICATION, UNSPECIFIED: ICD-10-CM

## 2020-01-10 DIAGNOSIS — S30.1XXA CONTUSION OF ABDOMINAL WALL, INITIAL ENCOUNTER: ICD-10-CM

## 2020-07-30 DIAGNOSIS — Z71.89 OTHER SPECIFIED COUNSELING: ICD-10-CM

## 2020-12-01 ENCOUNTER — OUTPATIENT (OUTPATIENT)
Dept: OUTPATIENT SERVICES | Facility: HOSPITAL | Age: 40
LOS: 1 days | End: 2020-12-01
Payer: MEDICAID

## 2020-12-01 DIAGNOSIS — Z98.890 OTHER SPECIFIED POSTPROCEDURAL STATES: Chronic | ICD-10-CM

## 2020-12-14 ENCOUNTER — EMERGENCY (EMERGENCY)
Facility: HOSPITAL | Age: 40
LOS: 0 days | Discharge: HOME | End: 2020-12-15
Attending: EMERGENCY MEDICINE | Admitting: EMERGENCY MEDICINE
Payer: MEDICAID

## 2020-12-14 VITALS
HEIGHT: 63 IN | RESPIRATION RATE: 18 BRPM | SYSTOLIC BLOOD PRESSURE: 136 MMHG | DIASTOLIC BLOOD PRESSURE: 88 MMHG | TEMPERATURE: 99 F | OXYGEN SATURATION: 98 % | HEART RATE: 109 BPM

## 2020-12-14 VITALS
RESPIRATION RATE: 18 BRPM | OXYGEN SATURATION: 98 % | SYSTOLIC BLOOD PRESSURE: 104 MMHG | HEART RATE: 95 BPM | DIASTOLIC BLOOD PRESSURE: 65 MMHG | TEMPERATURE: 98 F

## 2020-12-14 DIAGNOSIS — B34.9 VIRAL INFECTION, UNSPECIFIED: ICD-10-CM

## 2020-12-14 DIAGNOSIS — Z98.890 OTHER SPECIFIED POSTPROCEDURAL STATES: ICD-10-CM

## 2020-12-14 DIAGNOSIS — Z88.5 ALLERGY STATUS TO NARCOTIC AGENT: ICD-10-CM

## 2020-12-14 DIAGNOSIS — Z20.828 CONTACT WITH AND (SUSPECTED) EXPOSURE TO OTHER VIRAL COMMUNICABLE DISEASES: ICD-10-CM

## 2020-12-14 DIAGNOSIS — R07.9 CHEST PAIN, UNSPECIFIED: ICD-10-CM

## 2020-12-14 DIAGNOSIS — F17.200 NICOTINE DEPENDENCE, UNSPECIFIED, UNCOMPLICATED: ICD-10-CM

## 2020-12-14 DIAGNOSIS — Z98.890 OTHER SPECIFIED POSTPROCEDURAL STATES: Chronic | ICD-10-CM

## 2020-12-14 PROCEDURE — 99284 EMERGENCY DEPT VISIT MOD MDM: CPT

## 2020-12-14 PROCEDURE — 93010 ELECTROCARDIOGRAM REPORT: CPT

## 2020-12-14 RX ADMIN — Medication 25 MILLIGRAM(S): at 22:43

## 2020-12-14 NOTE — ED PROVIDER NOTE - PROGRESS NOTE DETAILS
LINDA: Reviewed all results and necessity for follow up. Counseled on red flags and to return for them.  Patient appears well on discharge.

## 2020-12-14 NOTE — ED PROVIDER NOTE - PATIENT PORTAL LINK FT
You can access the FollowMyHealth Patient Portal offered by [facility] by registering at the following website: [email or web address]. By joining [employer]’s FollowMyHealth portal, you will also be able to view your health information using other applications (apps) compatible with our system.

## 2020-12-14 NOTE — ED ADULT NURSE NOTE - OBJECTIVE STATEMENT
Patient presents to ED c/o stabbing left side chest pain associated with SOB. Patient also c/o right side swelling. Patient has hx of alcohol abuse, last drink 2 hrs ago. Patient denies any HI or SI ideations

## 2020-12-14 NOTE — ED PROVIDER NOTE - OBJECTIVE STATEMENT
41 y/o F with PMH SVT s/p 4 failed ablations, psoriasis on Cosentyx, polysubstance abuse--methadone, percocet, ETOH with hx DTs, last drink 6 hrs ago presents with diffuse constant moderate body aches, cough, congestion, runny nose, chills/sweats x 2 wks. despite triage note denies CP/sob. no palliating/provoking factors.   denies pregnancy.

## 2020-12-14 NOTE — ED PROVIDER NOTE - CLINICAL SUMMARY MEDICAL DECISION MAKING FREE TEXT BOX
39 yo M, hx of SVT sp failed ablation, polysubstance abuse, daily alcohol, occasional IN cocaine and oral methadone, no IVDA, psoriasis here for assessment of multiple complaints -- reports diffuse myalgias, nasal congestion, cough without sputum. Last drink 2 hours ago.     VS notable for , patient mildly intoxicated, has regular rhyhm, clear lungs, soft, NT, ND abdomen, psoriatic patches to arms and legs, no signs of infection.    EKG sinus tach at 101, CXR without infiltrate, PCR for covid pending.   Patient received librium as she states she has felt like this with withdrawal before, HR improved, patient comfortable.     will dc with outpatient detox info, return precautions and covid/quarantine information.

## 2020-12-14 NOTE — ED ADULT NURSE REASSESSMENT NOTE - NS ED NURSE REASSESS COMMENT FT1
Pt moved over to hot zone for r/o covid as per KIANA Aguiar, covid precautions initiated and maintained. Pt hand off report endorsed to HUYEN Armendariz RN made aware. Pt updated on continuing plan of care. Pt verbalized an understanding.

## 2020-12-14 NOTE — ED ADULT TRIAGE NOTE - CHIEF COMPLAINT QUOTE
Patient presents to ED c/o stabbing left side chest pain associated with SOB. Patient also c/o right side swelling. Patient has hx of alcohol abuse, last drink 2 hrs ago.

## 2020-12-14 NOTE — ED PROVIDER NOTE - NS ED ROS FT
Review of Systems    Constitutional: (-) fever   Eyes/ENT: (-) vision changes  Cardiovascular: (-) chest pain, (-) syncope (-) palpitations  Respiratory: (+) cough, (-) shortness of breath  Gastrointestinal: (-) vomiting, (-) diarrhea (-)black/bloody stools (-) abdominal pain  Genitourinary:  (-) dysuria   Musculoskeletal: (-) neck pain, (-) back pain, (-) leg pain/swelling  Integumentary: (-) rash, (-) edema  Neurological: (-) headache, (-) confusion  Hematologic: (-) easy bruising   Psychiatric: (-) hallucinations  Allergic/Immunologic: (-) pruritus

## 2020-12-14 NOTE — ED PROVIDER NOTE - PHYSICAL EXAMINATION
PHYSICAL EXAM:    GENERAL: Alert, appears stated age, well appearing, non-toxic  SKIN: Warm, pink and dry. MMM.   HEAD: NC  EYE: Normal lids/conjunctiva  ENT: Normal hearing, patent oropharynx without erythema or exudate  NECK: +supple. No meningismus, or JVD  Pulm: Bilateral BS, normal resp effort, no wheezes, stridor, or retractions  CV: RRR, no M/R/G, 2+and = radial pulses  Abd: soft, non-tender, non-distended. no CVA tenderness.   Mskel: no erythema, cyanosis, edema. no calf tenderness  Neuro: AAOx3, 5/5 strength throughout. normal gait.

## 2020-12-15 LAB — SARS-COV-2 RNA SPEC QL NAA+PROBE: SIGNIFICANT CHANGE UP

## 2020-12-15 PROCEDURE — 71045 X-RAY EXAM CHEST 1 VIEW: CPT | Mod: 26

## 2020-12-18 DIAGNOSIS — Z71.89 OTHER SPECIFIED COUNSELING: ICD-10-CM

## 2020-12-28 NOTE — ED ADULT NURSE NOTE - TEMPLATE LIST FOR HEAD TO TOE ASSESSMENT
Messages reviewed from last week. Called pt and left msg asking for an update on his condition.    Allergic RX

## 2021-03-01 PROCEDURE — G9005: CPT

## 2022-03-10 NOTE — ED PROVIDER NOTE - NS ED MD DISPO ADMITTING SERVICE
March 10, 2022    To whom it may concern:     Sridevi Vaishali Gonzalez was evaluated by me in clinic. Please allow her to use the restroom as needed.     Thank you,             Giuseppe Bullard M.D.      
March 10, 2022    To whom it may concern:    Sridevi Gonzalez was evaluated by me in clinic today. Please excuse her from school today.     Thank you.         Giuseppe Bullard M.D.      
MED

## 2022-03-18 ENCOUNTER — EMERGENCY (EMERGENCY)
Facility: HOSPITAL | Age: 42
LOS: 0 days | Discharge: HOME | End: 2022-03-18
Attending: STUDENT IN AN ORGANIZED HEALTH CARE EDUCATION/TRAINING PROGRAM | Admitting: STUDENT IN AN ORGANIZED HEALTH CARE EDUCATION/TRAINING PROGRAM
Payer: MEDICAID

## 2022-03-18 VITALS
HEART RATE: 104 BPM | WEIGHT: 139.99 LBS | HEIGHT: 63 IN | RESPIRATION RATE: 18 BRPM | SYSTOLIC BLOOD PRESSURE: 139 MMHG | OXYGEN SATURATION: 99 % | TEMPERATURE: 98 F | DIASTOLIC BLOOD PRESSURE: 74 MMHG

## 2022-03-18 DIAGNOSIS — Z98.890 OTHER SPECIFIED POSTPROCEDURAL STATES: Chronic | ICD-10-CM

## 2022-03-18 DIAGNOSIS — R68.84 JAW PAIN: ICD-10-CM

## 2022-03-18 DIAGNOSIS — Z88.5 ALLERGY STATUS TO NARCOTIC AGENT: ICD-10-CM

## 2022-03-18 DIAGNOSIS — I47.1 SUPRAVENTRICULAR TACHYCARDIA: ICD-10-CM

## 2022-03-18 PROCEDURE — 99283 EMERGENCY DEPT VISIT LOW MDM: CPT

## 2022-03-18 RX ORDER — IBUPROFEN 200 MG
600 TABLET ORAL ONCE
Refills: 0 | Status: COMPLETED | OUTPATIENT
Start: 2022-03-18 | End: 2022-03-18

## 2022-03-18 RX ADMIN — Medication 600 MILLIGRAM(S): at 22:42

## 2022-03-18 RX ADMIN — Medication 600 MILLIGRAM(S): at 23:33

## 2022-03-18 NOTE — ED ADULT TRIAGE NOTE - CHIEF COMPLAINT QUOTE
" I have pain on my left side jaw, on the bone area near my ear arthur when you touched it since last night.."

## 2022-03-18 NOTE — ED PROVIDER NOTE - CARE PROVIDER_API CALL
DAIANA BARNHART  Internal Medicine  [address] [place] [id number]  Phone: [phone]  Fax: [phone]  Follow Up Time: 1-3 Days

## 2022-03-18 NOTE — ED PROVIDER NOTE - CLINICAL SUMMARY MEDICAL DECISION MAKING FREE TEXT BOX
42 yo F w/ hx of SVT p/w pain to the left submandibular region. Afebrile. Reproducible tenderness to the left submandibular gland. No overlying erythema or discharge. no indication for imaging at this time.   instructed patient to attempt lemon drops for relief-  she should return to ED should she develop edema, fevers, pain with ROM of neck, change in voice, sob, or any other new symptoms.   ENT follow up encouraged within the next few days.

## 2022-03-18 NOTE — ED PROVIDER NOTE - PHYSICAL EXAMINATION
CONSTITUTIONAL: Well-developed; well-nourished; in no acute distress.   SKIN: warm, dry  HEAD: Normocephalic; atraumatic.  EYES: no conjunctival injection. PERRLA. EOMI.  TM intact pearly grey b/l  ENT: No nasal discharge; airway clear. +left angle of mandible tenderness to palpation  NECK: Supple; non tender. +left anterior cervical tenderness to palpation. noraml ROM  EXT: Normal ROM.  No clubbing, cyanosis or edema.   NEURO: Alert, oriented, grossly unremarkable.  PSYCH: Cooperative, appropriate.

## 2022-03-18 NOTE — ED PROVIDER NOTE - PATIENT PORTAL LINK FT
You can access the FollowMyHealth Patient Portal offered by Health system by registering at the following website: [email or web address]. By joining [employer]’s FollowMyHealth portal, you will also be able to view your health information using other applications (apps) compatible with our system.

## 2022-03-18 NOTE — ED PROVIDER NOTE - ATTENDING CONTRIBUTION TO CARE
40 yo F w/ hx of SVT p/w pain to the left submandibular region. Patient endorses pain to the "left jaw bone" since last night. Denies fevers, difficulty swallowing, change in voice, swelling, or pain with ROM of neck. 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 Denies chest pain. Patient appears very anxious, afraid it may be a heart attack.     On exam, she is anxious, making distrusting comments such as "you came into the room without knowing my history"- referring to her history of SVT in relation to her "jaw pain".     No dental tenderness or abscess on exam, reproducible tenderness to the left submandibular gland without any purulent drainage expressed on palpation. No edema noted. No tenderness to the left mandible itself, no trismus. Posterior oropharynx without edema or erythema or exudates. FROM of neck without issue. Mild tenderness to the left anterolateral neck however no overlying erythema. Afebrile on vitals.     likely sialoadenitis  no indication for imaging at this time.   instructed patient to attempt lemon drops for relief-  she should return to ED should she develop edema, fevers, pain with ROM of neck, change in voice, sob, or any other new symptoms.   ENT follow up encouraged within the next few days.

## 2022-03-18 NOTE — ED ADULT TRIAGE NOTE - MEANS OF ARRIVAL
ambulatory Isotretinoin Pregnancy And Lactation Text: This medication is Pregnancy Category X and is considered extremely dangerous during pregnancy. It is unknown if it is excreted in breast milk.

## 2022-03-18 NOTE — ED PROVIDER NOTE - NSFOLLOWUPINSTRUCTIONS_ED_ALL_ED_FT
Sialoadenitis    WHAT YOU NEED TO KNOW:    What is sialoadenitis? Sialoadenitis is an inflammation or infection of one or more of your salivary glands. A small stone can block the salivary gland and cause inflammation. Infection may be caused by a virus or bacteria. You can develop sialoadenitis on one or both sides of your face.    What increases my risk for sialoadenitis?   •Decreased saliva caused by dehydration, radiation, or other medicines      •Certain medical conditions including gout, HIV, diabetes, or tuberculosis (TB)      •Bacteria or viruses      •Trauma to the salivary gland      •Poor oral care      •Malnutrition      What are the signs and symptoms of sialoadenitis?   •Pain and swelling of a salivary gland, especially during or right after eating      •Bad breath or tooth pain      •Pus in your mouth      •Fever      How is sialoadenitis diagnosed? Your healthcare provider will ask about your symptoms and examine you. You may need a blood test to check for infection. An ultrasound or other imaging tests may show stones or any pockets of pus.     How is sialoadenitis treated?   •Medicines: ?Antibiotics may be given to treat a bacterial infection.       ?Acetaminophen decreases pain and fever. It is available without a doctor's order. Ask how much to give your child and how often to give it. Follow directions. Read the labels of all other medicines your child uses to see if they also contain acetaminophen, or ask your child's doctor or pharmacist. Acetaminophen can cause liver damage if not taken correctly.      ?NSAIDs, such as ibuprofen, help decrease swelling, pain, and fever. This medicine is available with or without a doctor's order. NSAIDs can cause stomach bleeding or kidney problems in certain people. If you take blood thinner medicine, always ask your healthcare provider if NSAIDs are safe for you. Always read the medicine label and follow directions.      •Procedures: ?Removal of one or more stones may be needed if other treatments do not work.       ?An incision and drainage may be needed if there is an abscess (pocket of pus) that does not respond to other treatments.        How can I manage or prevent sialoadenitis?   •Drink liquids as directed. You may need to drink more liquids than usual. Ask how much liquid to drink each day and which liquids are best for you. Good choices of liquids for most people include water, tea, soup, juice, or milk.      •Practice good oral care. Brush your teeth 2 times a day, 1 time in the morning and 1 time in the evening. Use a fluoride toothpaste. Floss your teeth 1 time each day, usually in the evening. Use alcohol-free mouthwash after you floss. Swish it around in your mouth for 30 seconds and spit it out.      •Keep your mouth moist. Suck on hard candy or chew sugarless gum to get your saliva flowing. Sour and tart flavors such as lemon and orange will help get saliva to flow. This will help keep your mouth moist and help push out a stone blocking your salivary duct.      •Apply a warm, wet cloth and massage the swollen area as directed. This may help relieve swelling and pain by pushing the pus out of the gland.       When should I seek immediate care?   •You have trouble breathing or swallowing because of swelling.          When should I call my doctor?   •You have trouble opening your mouth because of swelling.      •Your salivary gland gets more red and hot or drains more pus.      •The pain and swelling do not go away within 2 days, or they get worse.       •Your mouth is very dry.       •You lose movement on one side of your face.       •You have questions about your condition or care.      CARE AGREEMENT:    You have the right to help plan your care. Learn about your health condition and how it may be treated. Discuss treatment options with your healthcare providers to decide what care you want to receive. You always have the right to refuse treatment.

## 2022-03-18 NOTE — ED PROVIDER NOTE - OBJECTIVE STATEMENT
40 yo female, PMHx of SVT, presents with sudden onset sharp left jaw pain, radiating down left anterior neck, worse with palpation, alleviated with Percocet (last dose 1 pm), no associated symptoms. Denies fevers, chills, dental pain, headache, dizziness, hearing changes/tinnitus, ear pain.

## 2022-05-02 ENCOUNTER — APPOINTMENT (OUTPATIENT)
Dept: CARDIOLOGY | Facility: CLINIC | Age: 42
End: 2022-05-02
Payer: MEDICAID

## 2022-05-02 VITALS
TEMPERATURE: 97.2 F | WEIGHT: 136 LBS | BODY MASS INDEX: 24.1 KG/M2 | DIASTOLIC BLOOD PRESSURE: 80 MMHG | HEIGHT: 63 IN | SYSTOLIC BLOOD PRESSURE: 120 MMHG

## 2022-05-02 DIAGNOSIS — Z78.9 OTHER SPECIFIED HEALTH STATUS: ICD-10-CM

## 2022-05-02 DIAGNOSIS — Z84.1 FAMILY HISTORY OF DISORDERS OF KIDNEY AND URETER: ICD-10-CM

## 2022-05-02 DIAGNOSIS — Z80.0 FAMILY HISTORY OF MALIGNANT NEOPLASM OF DIGESTIVE ORGANS: ICD-10-CM

## 2022-05-02 DIAGNOSIS — Z83.3 FAMILY HISTORY OF DIABETES MELLITUS: ICD-10-CM

## 2022-05-02 DIAGNOSIS — R79.89 OTHER SPECIFIED ABNORMAL FINDINGS OF BLOOD CHEMISTRY: ICD-10-CM

## 2022-05-02 PROBLEM — Z00.00 ENCOUNTER FOR PREVENTIVE HEALTH EXAMINATION: Status: ACTIVE | Noted: 2022-05-02

## 2022-05-02 PROCEDURE — 99214 OFFICE O/P EST MOD 30 MIN: CPT | Mod: 25

## 2022-05-02 PROCEDURE — 93000 ELECTROCARDIOGRAM COMPLETE: CPT

## 2022-05-02 NOTE — REVIEW OF SYSTEMS
[Feeling Fatigued] : feeling fatigued [Weight Loss (___ Lbs)] : [unfilled] ~Ulb weight loss [Palpitations] : palpitations [Dizziness] : dizziness [Negative] : Heme/Lymph

## 2022-05-02 NOTE — ASSESSMENT
[FreeTextEntry1] : Persistent sinus tachycardia and h/o SVT ablation (? 4 times).\par - will obtain TTE to r/o cardiomyopathy.\par - will start Corlanor 5 mg PO BID given poor tolerance and low efficacy of b blockers in the past.\par - EP evaluation Dr. Davis.\par \par Elevated liver enzymes / low platelet count\par - will obtain US of abdomen to r/o hepatomegaly and splenomegaly\par \par F/U in 1 month\par \par

## 2022-05-02 NOTE — PHYSICAL EXAM
[Well Developed] : well developed [Well Nourished] : well nourished [Normal Conjunctiva] : normal conjunctiva [Normal Venous Pressure] : normal venous pressure [No Carotid Bruit] : no carotid bruit [Clear Lung Fields] : clear lung fields [Soft] : abdomen soft [Non Tender] : non-tender [Normal Gait] : normal gait [No Edema] : no edema [No Rash] : no rash [Moves all extremities] : moves all extremities [Alert and Oriented] : alert and oriented [de-identified] : tachycardic

## 2022-05-02 NOTE — HISTORY OF PRESENT ILLNESS
[FreeTextEntry1] : 41 y.o female with PMH of  tachycardia since childhood presents for an initial evaluation and to establish care. Patient  had 4 ablations, last 15 years ago with  unsuccessful results. She was offered PPM(?) but declined this option. Today, patient is in ST with HR of 140. As per patient her HR may go as fast as 240. She complains of worsening fatigue, jitteriness. 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 Heart racing can be accompanied by dizziness, nausea and vomiting. She takes Metoprolol PRN, states that b blockers make her feel dull and tired. Her recent blood work is significant for low platelet count and elevated liver enzymes.

## 2022-05-03 ENCOUNTER — RESULT CHARGE (OUTPATIENT)
Age: 42
End: 2022-05-03

## 2022-05-09 ENCOUNTER — APPOINTMENT (OUTPATIENT)
Dept: CARDIOLOGY | Facility: CLINIC | Age: 42
End: 2022-05-09
Payer: MEDICAID

## 2022-05-09 VITALS
OXYGEN SATURATION: 98 % | HEART RATE: 100 BPM | TEMPERATURE: 97.7 F | HEIGHT: 63 IN | SYSTOLIC BLOOD PRESSURE: 120 MMHG | RESPIRATION RATE: 16 BRPM | WEIGHT: 135 LBS | BODY MASS INDEX: 23.92 KG/M2 | DIASTOLIC BLOOD PRESSURE: 80 MMHG

## 2022-05-09 DIAGNOSIS — Z78.9 OTHER SPECIFIED HEALTH STATUS: ICD-10-CM

## 2022-05-09 DIAGNOSIS — L40.9 PSORIASIS, UNSPECIFIED: ICD-10-CM

## 2022-05-09 PROCEDURE — 93000 ELECTROCARDIOGRAM COMPLETE: CPT

## 2022-05-09 PROCEDURE — 99214 OFFICE O/P EST MOD 30 MIN: CPT | Mod: 25

## 2022-05-09 PROCEDURE — ZZZZZ: CPT

## 2022-05-09 RX ORDER — CLOBETASOL PROPIONATE 0.5 MG/ML
0.05 SOLUTION TOPICAL
Qty: 50 | Refills: 0 | Status: DISCONTINUED | COMMUNITY
Start: 2022-04-11 | End: 2022-05-09

## 2022-05-09 RX ORDER — METOPROLOL SUCCINATE 25 MG/1
25 TABLET, EXTENDED RELEASE ORAL
Qty: 90 | Refills: 0 | Status: DISCONTINUED | COMMUNITY
Start: 2022-03-14 | End: 2022-05-09

## 2022-05-09 RX ORDER — CHLORHEXIDINE GLUCONATE, 0.12% ORAL RINSE 1.2 MG/ML
0.12 SOLUTION DENTAL
Qty: 473 | Refills: 0 | Status: DISCONTINUED | COMMUNITY
Start: 2021-12-01 | End: 2022-05-09

## 2022-05-09 RX ORDER — FLUTICASONE PROPIONATE 50 UG/1
50 SPRAY, METERED NASAL
Qty: 16 | Refills: 0 | Status: DISCONTINUED | COMMUNITY
Start: 2022-04-20 | End: 2022-05-09

## 2022-05-09 NOTE — HISTORY OF PRESENT ILLNESS
[FreeTextEntry1] : \par Cardiologist: Dr. 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 Patient states she underwent 4 ablations for "SVT"  (2 at [facility] and 2 at [place]). She was told ablations were unsuccessful and she was informed she might need a pacemaker.  This was all in her 20s. She states she tried meds and lost weight. Symptoms became tolerable for the last 20 years.  \par \par She is now seeking care because she feels palpitations more frequently nowadays. Episodes occur 1-2x a week and last about 30 min. Episodes scooby with relaxing or taking Metoprolol after about 30 min. She used to pass out when she had palpitations but she doesn't any more. She also gets associated dyspnea, dizziness and lightheadedness. She denies chest pain. She has four children. She received the Corlanor over the weekend but did not start it yet. She has tried BB but they make her feel fatigued. Does not drink coffee/tea but does eat chocolate and occasionally has cherry coke. Pt states she feels well today because she had great sleep last night. If she is well rested, heartrate is better.

## 2022-05-09 NOTE — CARDIOLOGY SUMMARY
[de-identified] : 5/9/2022  Sinus tachycardia ( bpm) [de-identified] : none since her 20s [de-identified] : 12/23/2019 Tech difficult study. EF 60-65%

## 2022-05-09 NOTE — DISCUSSION/SUMMARY
[FreeTextEntry1] : I have recommended an event monitor to further evaluate her symptoms to determine if the symptoms may be related to a cardiac arrhythmia.  I educated the patient about the patient symptom activator feature and I have asked him/her to activate the event monitor whenever she has symptoms.

## 2022-05-09 NOTE — ASSESSMENT
[FreeTextEntry1] : # Palpitations, Sinus Tach, ? SVT\par - Obtain records from [facility]\par - Obtain records from Dr. Goldberg\par - 30 day MCOT to assess for cardiac arrhythmias\par - Pt scheduled for echo\par - Check exercise treadmill stress test to eval for exercise induced arrhythmias\par - Recent labs from March reviewed. Hg, TFTs, electrolytes stable. AST elevated. \par - Pulm referral to eval for ANNE\par - Advised pt to follow up with OB to check hormonal levels \par - Further decrease caffeine\par - Refrain from alcohol. \par \par I have also advised the patient to go to the nearest emergency room if s/he experiences any chest pain, dyspnea, syncope, or has any other compelling symptoms.\par \par Follow up in 6 weeks

## 2022-05-19 ENCOUNTER — APPOINTMENT (OUTPATIENT)
Dept: CARDIOLOGY | Facility: CLINIC | Age: 42
End: 2022-05-19
Payer: MEDICAID

## 2022-05-19 PROCEDURE — 93306 TTE W/DOPPLER COMPLETE: CPT

## 2022-05-23 ENCOUNTER — APPOINTMENT (OUTPATIENT)
Dept: CARDIOLOGY | Facility: CLINIC | Age: 42
End: 2022-05-23
Payer: MEDICAID

## 2022-05-23 PROCEDURE — 93015 CV STRESS TEST SUPVJ I&R: CPT

## 2022-06-03 ENCOUNTER — APPOINTMENT (OUTPATIENT)
Dept: CARDIOLOGY | Facility: CLINIC | Age: 42
End: 2022-06-03
Payer: MEDICAID

## 2022-06-03 ENCOUNTER — RESULT CHARGE (OUTPATIENT)
Age: 42
End: 2022-06-03

## 2022-06-03 VITALS
BODY MASS INDEX: 24.1 KG/M2 | WEIGHT: 136 LBS | DIASTOLIC BLOOD PRESSURE: 80 MMHG | HEIGHT: 63 IN | SYSTOLIC BLOOD PRESSURE: 114 MMHG | HEART RATE: 89 BPM

## 2022-06-03 PROCEDURE — 93000 ELECTROCARDIOGRAM COMPLETE: CPT

## 2022-06-03 PROCEDURE — 99214 OFFICE O/P EST MOD 30 MIN: CPT | Mod: 25

## 2022-06-03 RX ORDER — CEFDINIR 300 MG/1
300 CAPSULE ORAL
Qty: 20 | Refills: 0 | Status: DISCONTINUED | COMMUNITY
Start: 2022-04-21

## 2022-06-03 NOTE — ASSESSMENT
[FreeTextEntry1] : Persistent sinus tachycardia and h/o SVT ablation (? 4 times).\par Normal LVEF.\par Mildly elevated ALT and mild thrombocytopenia.\par \par Plan:\par MCOT placed, patient will complete 30-day monitoring period.\par HR has improved, patient will not take Corlanor for now.\par Abdominal ultrasound ordered, will repeat BW after next visit.\par F/u in 3 months.\par EP f/u.\par \par F/U in 1 month\par \par 
Average

## 2022-06-03 NOTE — REVIEW OF SYSTEMS
[Feeling Fatigued] : feeling fatigued [Palpitations] : palpitations [Dizziness] : dizziness [Negative] : Heme/Lymph

## 2022-06-03 NOTE — HISTORY OF PRESENT ILLNESS
[FreeTextEntry1] : 41 y.o female with PMH of  tachycardia since childhood presents for an initial evaluation and to establish care. Patient  had 4 ablations, last 15 years ago with  unsuccessful results. She was offered PPM(?) but declined this option.\par \par She still c/o palpitations but generally feels better since last visit. She has not started Corlanor.

## 2022-06-03 NOTE — PHYSICAL EXAM
[Well Developed] : well developed [Well Nourished] : well nourished [Normal Conjunctiva] : normal conjunctiva [Normal Venous Pressure] : normal venous pressure [No Carotid Bruit] : no carotid bruit [Clear Lung Fields] : clear lung fields [Soft] : abdomen soft [Non Tender] : non-tender [Normal Gait] : normal gait [No Edema] : no edema [No Rash] : no rash [Moves all extremities] : moves all extremities [Alert and Oriented] : alert and oriented [Normal S1, S2] : normal S1, S2 [No Murmur] : no murmur [No Gallop] : no gallop

## 2022-06-03 NOTE — CARDIOLOGY SUMMARY
[de-identified] : 06/03/22:\par SR 89/min, normal ECG. [de-identified] : 05/23/22:\par 7 METs, stopped due to reaching max HR\par No ischemia or arrhythmia. [de-identified] : 05/19/21:\par LVEF 66%\par Mild TR.

## 2022-06-15 ENCOUNTER — NON-APPOINTMENT (OUTPATIENT)
Age: 42
End: 2022-06-15

## 2022-08-08 ENCOUNTER — APPOINTMENT (OUTPATIENT)
Dept: CARDIOLOGY | Facility: CLINIC | Age: 42
End: 2022-08-08

## 2022-08-22 ENCOUNTER — APPOINTMENT (OUTPATIENT)
Dept: CARDIOLOGY | Facility: CLINIC | Age: 42
End: 2022-08-22

## 2022-08-22 VITALS
HEART RATE: 86 BPM | WEIGHT: 128 LBS | DIASTOLIC BLOOD PRESSURE: 86 MMHG | RESPIRATION RATE: 16 BRPM | OXYGEN SATURATION: 98 % | BODY MASS INDEX: 22.68 KG/M2 | TEMPERATURE: 97.6 F | SYSTOLIC BLOOD PRESSURE: 120 MMHG | HEIGHT: 63 IN

## 2022-08-22 PROCEDURE — 99215 OFFICE O/P EST HI 40 MIN: CPT | Mod: 25

## 2022-08-22 PROCEDURE — 93000 ELECTROCARDIOGRAM COMPLETE: CPT

## 2022-08-22 NOTE — HISTORY OF PRESENT ILLNESS
[FreeTextEntry1] : \par Cardiologist: Dr. 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 Patient states she underwent 4 ablations for "SVT"  (2 at [facility] and 2 at [place]). She was told ablations were unsuccessful and she was informed she might need a pacemaker.  This was all in her 20s. She states she tried meds and lost weight. Symptoms became tolerable for the last 20 years.  \par \par She is seeking care because she feels palpitations more frequently. Episodes occur 1-2x a week and last about 30 min. Episodes scooby with relaxing or taking Metoprolol after about 30 min. She used to pass out when she had palpitations but she doesn't any more. She also gets associated dyspnea, dizziness and lightheadedness. She denies chest pain. She has four children. She received Corlanor from her cardiologist over the weekend but did not start it yet. She has tried BB but they make her feel fatigued. Does not drink coffee/tea but does eat chocolate and occasionally has cherry coke. \par \par She presents today for routine follow up visit to review echo, stress, and MCOT. She did have symptoms but not the severe ones while wearing the MCOT. She did have an episode 2 days after returning the MCOT. Since her last visit, she only took Metoprolol 4x (takes it as needed).

## 2022-08-22 NOTE — ASSESSMENT
[FreeTextEntry1] : # Palpitations, Sinus Tach, ? SVT\par - Records from [facility] and [facility] reviewed\par - 30 day MCOT with only 11 beats of paroxysmsal SVT and symptoms corresponding to NSR/ST. Pt denies severe palpitations while wearing MCOT. She is interested in further long term monitoring. Will schedule loop implant. \par - 2D echo with structurally normal heart\par - Exercise treadmill stress without exercise induced arrhythmias aside from occ PVCs and no ischemia\par - Recent labs from March reviewed. Hg, TFTs, electrolytes stable. AST elevated. \par - Pulm referral to eval for ANNE provided at last visit. Advised pt to follow up as she has not scheduled appt. \par - Advised pt to follow up with OB to check hormonal levels \par - Advised pt to decrease caffeine and refrain from alcohol. \par \par I have also advised the patient to go to the nearest emergency room if she experiences any chest pain, dyspnea, syncope, or has any other compelling symptoms.\par \par Follow up 4-6 weeks after loop implant.

## 2022-08-22 NOTE — CARDIOLOGY SUMMARY
[de-identified] : 8/22/2022 NSR (HR 86 bpm)\par 5/9/2022  Sinus tachycardia ( bpm) [de-identified] : 5/9-6/7/22  27 days\par Avg HR 79 bpm.\par 16 symptom episodes reported c/w sinus rhythm/sinus tach. \par 1% PACs, 1% PVCs\par 11 beats paroxysmal SVT. No sig arrhythmias. [de-identified] : 5/23/2022 Venkatesh 4:27, 7.1 METS, 97% MPHR\par No ischemia. \par Occ PVCs. No sig arrhythmias.  [de-identified] : 5/19/2022 EF 66% Mild TR. Normal LA size.\par 12/23/2019 Tech difficult study. EF 60-65% [de-identified] : EPS at [facility] 1/2006 - absence of dual AVN physiology. Positive for inducible AT (left atrial) and AFlutter\par \par EPS at [facility] 2/2006 (Dr. Bruce Lerman) Multiple inducible sustained ATs and AFlutter. \par Ablation of anterior mitral annular focal AT, right septal tricuspid annular focal AT and CTI dependent typical AFlutter.

## 2022-08-22 NOTE — PHYSICAL EXAM
[Well Developed] : well developed [Well Nourished] : well nourished [No Acute Distress] : no acute distress [Normal Venous Pressure] : normal venous pressure [Normal S1, S2] : normal S1, S2 [No Murmur] : no murmur [Clear Lung Fields] : clear lung fields [Good Air Entry] : good air entry [No Respiratory Distress] : no respiratory distress  [Soft] : abdomen soft [Normal Gait] : normal gait [No Edema] : no edema [No Rash] : no rash [Moves all extremities] : moves all extremities [Normal Speech] : normal speech [Alert and Oriented] : alert and oriented

## 2022-09-06 ENCOUNTER — LABORATORY RESULT (OUTPATIENT)
Age: 42
End: 2022-09-06

## 2022-09-09 ENCOUNTER — OUTPATIENT (OUTPATIENT)
Dept: OUTPATIENT SERVICES | Facility: HOSPITAL | Age: 42
LOS: 1 days | Discharge: HOME | End: 2022-09-09

## 2022-09-09 DIAGNOSIS — Z98.890 OTHER SPECIFIED POSTPROCEDURAL STATES: Chronic | ICD-10-CM

## 2022-09-09 DIAGNOSIS — R00.2 PALPITATIONS: ICD-10-CM

## 2022-09-09 PROCEDURE — 33285 INSJ SUBQ CAR RHYTHM MNTR: CPT

## 2022-09-09 RX ORDER — CEPHALEXIN 500 MG
500 CAPSULE ORAL ONCE
Refills: 0 | Status: DISCONTINUED | OUTPATIENT
Start: 2022-09-09 | End: 2022-09-24

## 2022-09-09 NOTE — CHART NOTE - NSCHARTNOTEFT_GEN_A_CORE
EP PROCEDURE NOTE    Date of Procedure: 09-09-22  EP Attending:   Assistant:  Referring Physician:   Procedure: Loop Recorder Implant    Indication: 41y Female with history of *** referred for implantable loop recorder.     Anesthesia: Local    EQUIPMENT IMPLANTED  : [employer] Jot Dx  Model Number: [id number]  Serial Number: [id number]    PROCEDURE DESCRIPTION  The patient was brought to the electrophysiology procedure area in a non-sedated state and received preoperative antibiotics. Informed, written consent was obtained prior to the procedure. The left anterior chest region was prepped and cleaned from the nipple to the upper left clavicular border with serial applications of Chlorhexidine. Patient was then covered with sterile drapes in the usual manner. Blood pressure, oxygenation, and level of comfort were stable throughout.     Following infiltration with local anesthetic, a 1-cm incision was made along the left sternal border at the fourth intercostal space. Using the tunneling device the implantable loop recorder was inserted into the subcutaneous tissue. Direct pressure was applied to the wound to obtain hemostasis. The wound was approximated with Dermabond. Steri-strips and a dry, sterile dressing were placed over the wound. R waves were noted to be 0.20 mV.     The patient tolerated the procedure well.     COMPLICATIONS  No immediate complications    CONCLUSIONS  Successful loop recorder implant    EBL: 2 cc EP PROCEDURE NOTE    Date of Procedure: 09-09-22  EP Attending:   Assistant:  Referring Physician:   Procedure: Loop Recorder Implant    Indication: 41y Female with history of palpitations and SVT referred for implantable loop recorder.     Anesthesia: Local    EQUIPMENT IMPLANTED  : [employer] Jot Dx  Model Number: [id number]  Serial Number: [id number]    PROCEDURE DESCRIPTION  The patient was brought to the electrophysiology procedure area in a non-sedated state and received preoperative antibiotics. Informed, written consent was obtained prior to the procedure. The left anterior chest region was prepped and cleaned from the nipple to the upper left clavicular border with serial applications of Chlorhexidine. Patient was then covered with sterile drapes in the usual manner. Blood pressure, oxygenation, and level of comfort were stable throughout.     Following infiltration with local anesthetic, a 1-cm incision was made along the left sternal border at the fourth intercostal space. Using the tunneling device the implantable loop recorder was inserted into the subcutaneous tissue. Direct pressure was applied to the wound to obtain hemostasis. The wound was approximated with Dermabond. Steri-strips and a dry, sterile dressing were placed over the wound. R waves were noted to be 0.20 mV.     The patient tolerated the procedure well.     COMPLICATIONS  No immediate complications    CONCLUSIONS  Successful loop recorder implant    EBL: 2 cc EP PROCEDURE NOTE    Date of Procedure: 09-09-22  EP Attending: Yessenia Davis MD  Assistant: KIANA Balderas  Referring Physician: Tyler Inman MD  Procedure: Loop Recorder Implant    Indication: 41y Female with history of palpitations and SVT referred for implantable loop recorder.     Anesthesia: Local    EQUIPMENT IMPLANTED  : [employer] Jot Dx  Model Number: [id number]  Serial Number: [id number]    PROCEDURE DESCRIPTION  The patient was brought to the electrophysiology procedure area in a non-sedated state and received preoperative antibiotics. Informed, written consent was obtained prior to the procedure. The left anterior chest region was prepped and cleaned from the nipple to the upper left clavicular border with serial applications of Chlorhexidine. Patient was then covered with sterile drapes in the usual manner. Blood pressure, oxygenation, and level of comfort were stable throughout.     Following infiltration with local anesthetic, a 1-cm incision was made along the left sternal border at the fourth intercostal space. Using the tunneling device the implantable loop recorder was inserted into the subcutaneous tissue. Direct pressure was applied to the wound to obtain hemostasis. The wound was approximated with Dermabond. Steri-strips and a dry, sterile dressing were placed over the wound. R waves were noted to be 0.20 mV.     The patient tolerated the procedure well.     COMPLICATIONS  No immediate complications    CONCLUSIONS  Successful loop recorder implant    EBL: 2 cc

## 2022-09-09 NOTE — H&P ADULT - NSHPPHYSICALEXAM_GEN_ALL_CORE
CONST: Well appearing in NAD  EYES: PERRL, EOMI, Sclera and conjunctiva clear.   NECK: Supple  CARD: Normal S1 S2; Normal rate and rhythm  RESP: Equal BS B/L, No wheezes, rhonchi or rales. No distress  MS: No LE edema BL  NEURO: A&Ox3, No focal deficits.

## 2022-09-09 NOTE — H&P ADULT - ASSESSMENT
40 yo F with hx of palpitations here for loop implant    Plan:  - Keflex 500mg x 1 dose  - Loop Implant  - Follow up 1 month for wound check

## 2022-09-09 NOTE — PROGRESS NOTE ADULT - SUBJECTIVE AND OBJECTIVE BOX
Electrophysiology Brief Post-Op Note    I have personally seen and examined the patient.  I agree with the history and physical which I have reviewed and noted any changes below.  09-09-22 @ 16:13    PRE-OP DIAGNOSIS:SVT    POST-OP DIAGNOSIS: SVT    PROCEDURE: Loop Implant    Physician: Dr. Davis  Assistant: KIANA Balderas    ESTIMATED BLOOD LOSS:  2    mL    ANESTHESIA TYPE:  [  ]General Anesthesia  [  ] Sedation  [X  ] Local/Regional    CONDITION  [  ] Critical  [  ] Serious  [  ]Fair  [ X ]Good    SPECIMENS REMOVED (IF APPLICABLE):  none    IMPLANTS (IF APPLICABLE)  Loop Recorder ([employer] Julio)    FINDINGS  PLAN OF CARE  - F/U 3-4 weeks  - May remove bandaid tomorrow  - May shower in 48 hours                   Electrophysiology Brief Post-Op Note    I have personally seen and examined the patient.  I agree with the history and physical which I have reviewed and noted any changes below.  09-09-22 @ 5720    PRE-OP DIAGNOSIS: Paroxysmal SVT    POST-OP DIAGNOSIS: Paroxysmal SVT    PROCEDURE: Loop Implant    Physician: Dr. Davis  Assistant: KIANA Balderas    ESTIMATED BLOOD LOSS:  2    mL    ANESTHESIA TYPE:  [  ]General Anesthesia  [  ] Sedation  [X  ] Local/Regional    CONDITION  [  ] Critical  [  ] Serious  [  ]Fair  [ X ]Good    SPECIMENS REMOVED (IF APPLICABLE):  none    IMPLANTS (IF APPLICABLE)  Loop Recorder ([employer] Julio)    FINDINGS  PLAN OF CARE  - F/U 3-4 weeks  - May remove bandaid tomorrow  - May shower in 48 hours

## 2022-09-09 NOTE — H&P ADULT - NSHPREVIEWOFSYSTEMS_GEN_ALL_CORE
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 Patient states she underwent 4 ablations for "SVT" (2 at [facility] and 2 at [place]). She was told ablations were unsuccessful and she was informed she might need a pacemaker. This was all in her 20s. She states she tried meds and lost weight. Symptoms became tolerable for the last 20 years. She is seeking care because she feels palpitations more frequently. Episodes occur 1-2x a week and last about 30 min. Episodes scooby with relaxing or taking Metoprolol after about 30 min. She used to pass out when she had palpitations but she doesn't any more. She also gets associated dyspnea, dizziness and lightheadedness. She denies chest pain. She has four children. She received Corlanor from her cardiologist over the weekend but did not start it yet. She has tried BB but they make her feel fatigued. Does not drink coffee/tea but does eat chocolate and occasionally has cherry coke. She presents today for routine follow up visit to review echo, stress, and MCOT. She did have symptoms but not the severe ones while wearing the MCOT. She did have an episode 2 days after returning the MCOT. Since her last visit, she only took Metoprolol 4x (takes it as needed). Patient here today for loop implant.

## 2022-09-13 DIAGNOSIS — I47.1 SUPRAVENTRICULAR TACHYCARDIA: ICD-10-CM

## 2022-09-16 ENCOUNTER — RESULT CHARGE (OUTPATIENT)
Age: 42
End: 2022-09-16

## 2022-09-16 ENCOUNTER — APPOINTMENT (OUTPATIENT)
Dept: CARDIOLOGY | Facility: CLINIC | Age: 42
End: 2022-09-16

## 2022-09-16 VITALS
HEART RATE: 96 BPM | BODY MASS INDEX: 22.86 KG/M2 | RESPIRATION RATE: 16 BRPM | TEMPERATURE: 97.9 F | WEIGHT: 129 LBS | HEIGHT: 63 IN | SYSTOLIC BLOOD PRESSURE: 130 MMHG | DIASTOLIC BLOOD PRESSURE: 82 MMHG

## 2022-09-16 DIAGNOSIS — D69.6 THROMBOCYTOPENIA, UNSPECIFIED: ICD-10-CM

## 2022-09-16 PROCEDURE — 93000 ELECTROCARDIOGRAM COMPLETE: CPT

## 2022-09-16 PROCEDURE — 99213 OFFICE O/P EST LOW 20 MIN: CPT | Mod: 25

## 2022-09-16 RX ORDER — HALOBETASOL PROPIONATE 0.5 MG/G
0.05 CREAM TOPICAL
Qty: 50 | Refills: 0 | Status: DISCONTINUED | COMMUNITY
Start: 2022-04-11 | End: 2022-09-16

## 2022-09-16 RX ORDER — CALCIPOTRIENE 50 UG/G
0.01 OINTMENT TOPICAL
Qty: 120 | Refills: 0 | Status: DISCONTINUED | COMMUNITY
Start: 2022-04-11 | End: 2022-09-16

## 2022-09-16 RX ORDER — KETOCONAZOLE 20.5 MG/ML
2 SHAMPOO, SUSPENSION TOPICAL
Qty: 120 | Refills: 0 | Status: DISCONTINUED | COMMUNITY
Start: 2022-04-11 | End: 2022-09-16

## 2022-09-16 RX ORDER — IVABRADINE 5 MG/1
5 TABLET, FILM COATED ORAL
Qty: 60 | Refills: 0 | Status: DISCONTINUED | COMMUNITY
Start: 2022-05-02 | End: 2022-09-16

## 2022-09-16 NOTE — PHYSICAL EXAM
[Well Developed] : well developed [Well Nourished] : well nourished [Normal Conjunctiva] : normal conjunctiva [Normal Venous Pressure] : normal venous pressure [No Carotid Bruit] : no carotid bruit [Normal S1, S2] : normal S1, S2 [No Murmur] : no murmur [No Gallop] : no gallop [Clear Lung Fields] : clear lung fields [Soft] : abdomen soft [Non Tender] : non-tender [Normal Gait] : normal gait [No Edema] : no edema [No Rash] : no rash [Moves all extremities] : moves all extremities [Alert and Oriented] : alert and oriented

## 2022-09-16 NOTE — ASSESSMENT
[FreeTextEntry1] : Persistent sinus tachycardia and h/o SVT ablation (? 4 times).\par Normal LVEF.\par Mildly elevated ALT and mild thrombocytopenia.\par \par Plan:\par S/p ILR.\par Continue Metoprolol.\par Will repeat blood work after next visit.\par F/u in 6 months.\par EP f/u.\par \par Tyler Inman MD\par \par \par

## 2022-09-16 NOTE — HISTORY OF PRESENT ILLNESS
[FreeTextEntry1] : 41 y.o female with PMH of  tachycardia since childhood presents for an initial evaluation and to establish care. Patient  had 4 ablations, last 15 years ago with  unsuccessful results. She was offered PPM(?) but declined this option.\par \par She still c/o palpitations but generally feels better since last visit. She has been taking Metoprolol. S/p ILR placement.

## 2022-09-16 NOTE — CARDIOLOGY SUMMARY
[de-identified] : 09/16/22:\par SR 96/min, NSST changes. [de-identified] : MCOT:\par SR with ST\par 1 episode of PSVT. [de-identified] : 05/23/22:\par 7 METs, stopped due to reaching max HR\par No ischemia or arrhythmia. [de-identified] : 05/19/21:\par LVEF 66%\par Mild TR.

## 2022-09-26 ENCOUNTER — APPOINTMENT (OUTPATIENT)
Dept: CARDIOLOGY | Facility: CLINIC | Age: 42
End: 2022-09-26

## 2022-09-28 ENCOUNTER — NON-APPOINTMENT (OUTPATIENT)
Age: 42
End: 2022-09-28

## 2022-10-17 ENCOUNTER — APPOINTMENT (OUTPATIENT)
Dept: CARDIOLOGY | Facility: CLINIC | Age: 42
End: 2022-10-17

## 2022-10-17 ENCOUNTER — APPOINTMENT (OUTPATIENT)
Dept: CARDIOLOGY | Facility: CLINIC | Age: 42
End: 2022-10-17
Payer: MEDICAID

## 2022-10-17 VITALS
HEIGHT: 63 IN | TEMPERATURE: 98 F | WEIGHT: 130 LBS | RESPIRATION RATE: 16 BRPM | SYSTOLIC BLOOD PRESSURE: 121 MMHG | HEART RATE: 81 BPM | DIASTOLIC BLOOD PRESSURE: 83 MMHG | BODY MASS INDEX: 23.04 KG/M2

## 2022-10-17 DIAGNOSIS — Z48.89 ENCOUNTER FOR OTHER SPECIFIED SURGICAL AFTERCARE: ICD-10-CM

## 2022-10-17 PROCEDURE — 93291 INTERROG DEV EVAL SCRMS IP: CPT

## 2022-10-17 PROCEDURE — 93000 ELECTROCARDIOGRAM COMPLETE: CPT | Mod: 59

## 2022-10-17 PROCEDURE — 99212 OFFICE O/P EST SF 10 MIN: CPT

## 2022-10-18 PROBLEM — Z48.89 ENCOUNTER FOR POSTOPERATIVE WOUND CHECK: Status: ACTIVE | Noted: 2022-10-18

## 2022-10-23 NOTE — ASSESSMENT
[FreeTextEntry1] : s/p [employer]io loop implant on 9/9 for palpitations\par presents today for device interrogation and wound check\par \par # Incision check - left parasternal over left side of breast. Device is prominent and the incision remained partially healed. \par \par # Device interrogation - multiple symptoms event likely at/svt\par \par # She is enrolled in remote monitoring using phone jenny.\par Discussed with the  schedule and process of remote monitoring,presence and  availability of remote team.I also mentioned that device doesn't monitor if patient is having a heart attack, if they have symptoms/cardiac event they should not wait for a call from remote team, but proceed to ED.\par I added that they will received bills for remote monitoring for co-payment that is not covered by their insurance.\par \par Will bring in patient in 2 weeks time to reassess incision site #2. The loop recorder tachy parameter was decreased to 150 BPM to check on the onset. This will also prevent the patient from sending symptomatic event. \par

## 2022-10-23 NOTE — CARDIOLOGY SUMMARY
[de-identified] : 10/17/2022 80 sinus rhythm \par 5/9/2022  Sinus tachycardia ( bpm) [de-identified] : none since her 20s\par (5/23/2022) there was no ischemia and no arrhythmia.\par  [de-identified] : (5/19/2022) ejection fraction 66% with mild tricuspid regurgitation.\par 12/23/2019 Tech difficult study. EF 60-65%

## 2022-10-23 NOTE — PROCEDURE
[No] : not [NSR] : normal sinus rhythm [Sensing Amplitude ___mv] : sensing amplitude was [unfilled] mv [See Scanned Paceart Report] : See scanned paceart report [de-identified] : [employer] Osiel [employer]GHLIN ICM [de-identified] : [id number] [de-identified] : [id number] [de-identified] : 9/9/2022 [de-identified] : multiple symptomatic events consistent with ST/SVT

## 2022-10-23 NOTE — DISCUSSION/SUMMARY
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 Patient states she underwent 4 ablations for "SVT"  (2 at [facility] and 2 at [place]). She was told ablations were unsuccessful and she was informed she might need a pacemaker.  This was all in her 20s. She states she tried meds and lost weight. Symptoms became tolerable for the last 20 years.  \par \par She is now seeking care because she feels palpitations more frequently nowadays. Episodes occur 1-2x a week and last about 30 min. Episodes scooby with relaxing or taking Metoprolol after about 30 min. She used to pass out when she had palpitations but she doesn't any more. She also gets associated dyspnea, dizziness and lightheadedness. She denies chest pain. She has four children. She received the Corlanor over the weekend but did not start it yet. She has tried BB but they make her feel fatigued. Does not drink coffee/tea but does eat chocolate and occasionally has cherry coke. Pt states she feels well today because she had great sleep last night. If she is well rested, heart rate is better. \par \par A [employer] lona loop recorder was implanted on 9/9/2022

## 2022-10-23 NOTE — ADDENDUM
[FreeTextEntry1] : \par \par \par IPriyanka assisted in documentation on 10/19/2022 acting as a scribe for Dr. Umm Jeff.\par \par

## 2022-10-23 NOTE — DISCUSSION/SUMMARY
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 Patient states she underwent 4 ablations for "SVT"  (2 at [facility] and 2 at [place]). She was told ablations were unsuccessful and she was informed she might need a pacemaker.  This was all in her 20s. She states she tried meds and lost weight. Symptoms became tolerable for the last 20 years.  \par \par She is now seeking care because she feels palpitations more frequently nowadays. Episodes occur 1-2x a week and last about 30 min. Episodes scooby with relaxing or taking Metoprolol after about 30 min. She used to pass out when she had palpitations but she doesn't any more. She also gets associated dyspnea, dizziness and lightheadedness. She denies chest pain. She has four children. She received the Corlanor over the weekend but did not start it yet. She has tried BB but they make her feel fatigued. Does not drink coffee/tea but does eat chocolate and occasionally has cherry coke. Pt states she feels well today because she had great sleep last night. If she is well rested, heart rate is better. \par \par A [employer] lona loop recorder was implanted on 9/9/2022

## 2022-10-23 NOTE — HISTORY OF PRESENT ILLNESS
[FreeTextEntry1] : cardiologist: Dr. Inman\par Cardiologist: Dr. 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 Patient states she underwent 4 ablations for "SVT"  (2 at [facility] and 2 at [place]). She was told ablations were unsuccessful and she was informed she might need a pacemaker.  This was all in her 20s. She states she tried meds and lost weight. Symptoms became tolerable for the last 20 years.\par \par \par A [employer] lona loop recorder was implanted on 9/9/2022

## 2022-10-23 NOTE — PROCEDURE
[No] : not [NSR] : normal sinus rhythm [Sensing Amplitude ___mv] : sensing amplitude was [unfilled] mv [See Scanned Paceart Report] : See scanned paceart report [de-identified] : [employer]iel MCLAUGHLIN ICM [de-identified] : [id number] [de-identified] : [id number] [de-identified] : 9/9/2022 [de-identified] : multiple symptomatic events consistent with ST/SVT

## 2022-10-23 NOTE — HISTORY OF PRESENT ILLNESS
[FreeTextEntry1] : cardiologist: Dr. Inman\par Cardiologist: Dr. 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 Patient states she underwent 4 ablations for "SVT"  (2 at [facility] and 2 at [place]). She was told ablations were unsuccessful and she was informed she might need a pacemaker.  This was all in her 20s. She states she tried meds and lost weight. Symptoms became tolerable for the last 20 years.\par \par \par A [employer] lona loop recorder was implanted on 9/9/2022

## 2022-10-23 NOTE — PHYSICAL EXAM
[General Appearance - Well Developed] : well developed [Normal Appearance] : normal appearance [Well Groomed] : well groomed [General Appearance - Well Nourished] : well nourished [No Deformities] : no deformities [General Appearance - In No Acute Distress] : no acute distress [Heart Rate And Rhythm] : heart rate and rhythm were normal [Heart Sounds] : normal S1 and S2 [Murmurs] : no murmurs present [] : no respiratory distress [Respiration, Rhythm And Depth] : normal respiratory rhythm and effort [Exaggerated Use Of Accessory Muscles For Inspiration] : no accessory muscle use [Auscultation Breath Sounds / Voice Sounds] : lungs were clear to auscultation bilaterally [Healing Well] : healing well [Erythema] : erythematous [Abdomen Soft] : soft [FreeTextEntry2] : Incision site has fully close as of yet. [FreeTextEntry1] : Left parasternal

## 2022-10-25 ENCOUNTER — APPOINTMENT (OUTPATIENT)
Dept: CARDIOLOGY | Facility: CLINIC | Age: 42
End: 2022-10-25

## 2022-10-25 ENCOUNTER — NON-APPOINTMENT (OUTPATIENT)
Age: 42
End: 2022-10-25

## 2022-10-25 PROCEDURE — 93298 REM INTERROG DEV EVAL SCRMS: CPT

## 2022-10-25 PROCEDURE — G2066: CPT

## 2022-10-25 NOTE — ED PROVIDER NOTE - NS ED ROS FT
"Oncology Rooming Note    October 25, 2022 11:16 AM   Beena De Souza is a 71 year old female who presents for:    Chief Complaint   Patient presents with     Oncology Clinic Visit     Other iron deficiency anemia     Initial Vitals: BP 99/56   Pulse 75   Temp 97.2  F (36.2  C) (Tympanic)   Resp 16   Wt 50.8 kg (112 lb 1.6 oz)   SpO2 94%   BMI 19.55 kg/m   Estimated body mass index is 19.55 kg/m  as calculated from the following:    Height as of 9/23/22: 1.613 m (5' 3.5\").    Weight as of this encounter: 50.8 kg (112 lb 1.6 oz). Body surface area is 1.51 meters squared.  No Pain (0) Comment: Data Unavailable   No LMP recorded. Patient is postmenopausal.  Allergies reviewed: Yes  Medications reviewed: Yes    Medications: Medication refills not needed today.  Pharmacy name entered into [employer]: [id number] - [place], [place] - [address]              "
GEN:  no fever, no chills, no generalized weakness  NEURO:  no headache, no dizziness  ENT: no sore throat, no runny nose, +left jaw pain  MSK:  no joint pain, no edema  SKIN:  no rash, no bruising

## 2022-10-31 ENCOUNTER — APPOINTMENT (OUTPATIENT)
Dept: CARDIOLOGY | Facility: CLINIC | Age: 42
End: 2022-10-31

## 2022-10-31 VITALS
BODY MASS INDEX: 22.68 KG/M2 | HEIGHT: 63 IN | WEIGHT: 128 LBS | SYSTOLIC BLOOD PRESSURE: 110 MMHG | HEART RATE: 128 BPM | DIASTOLIC BLOOD PRESSURE: 70 MMHG | TEMPERATURE: 97.3 F

## 2022-10-31 VITALS
SYSTOLIC BLOOD PRESSURE: 110 MMHG | DIASTOLIC BLOOD PRESSURE: 70 MMHG | TEMPERATURE: 97.3 F | HEART RATE: 128 BPM | HEIGHT: 60 IN

## 2022-10-31 DIAGNOSIS — R00.0 TACHYCARDIA, UNSPECIFIED: ICD-10-CM

## 2022-10-31 PROCEDURE — 93000 ELECTROCARDIOGRAM COMPLETE: CPT | Mod: 59

## 2022-10-31 PROCEDURE — 93291 INTERROG DEV EVAL SCRMS IP: CPT

## 2022-11-02 NOTE — ASSESSMENT
[FreeTextEntry1] : s/p [employer] loop implant on 9/9 for palpitations\par presents today for device interrogation and reassess loop insertion site\par \par She is sinus tach at 146bpm . no symptoms. during the entire visit.\par Dr Rosado came in the room and recommends to start corlanor and d/c metoprolol\par Will start 2.5mg twice daily and will titrate.\par \par \par # Incision check - left parasternal over left side of breast. Device is prominent and the incision is healed \par \par # Device interrogation - multiple symptoms event likely at/svt\par \par \par \par RTO in 3 months time

## 2022-11-02 NOTE — HISTORY OF PRESENT ILLNESS
[FreeTextEntry1] : cardiologist: Dr. Inman\par Cardiologist: Dr. 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 Patient states she underwent 4 ablations for "SVT"  (2 at [facility] and 2 at [place]). She was told ablations were unsuccessful and she was informed she might need a pacemaker.  This was all in her 20s. She states she tried meds and lost weight. Symptoms became tolerable for the last 20 years.\par \par \par A [employer] lona loop recorder was implanted on 9/9/2022.\par Presents for follow up to check on the incision since it hasn't fully healed.\par

## 2022-11-02 NOTE — CARDIOLOGY SUMMARY
[de-identified] : 10/31/2022 128 sinus tach\par 10/17/2022 80 sinus rhythm \par 5/9/2022  Sinus tachycardia ( bpm) [de-identified] : none since her 20s\par (5/23/2022) there was no ischemia and no arrhythmia.\par  [de-identified] : (5/19/2022) ejection fraction 66% with mild tricuspid regurgitation.\par 12/23/2019 Tech difficult study. EF 60-65%

## 2022-11-02 NOTE — PROCEDURE
[No] : not [NSR] : normal sinus rhythm [See Scanned Paceart Report] : See scanned paceart report [Sensing Amplitude ___mv] : sensing amplitude was [unfilled] mv [de-identified] : Abbot Osiel MCLAUGHLIN ICM [de-identified] : [id number] [de-identified] : [id number] [de-identified] : 9/9/2022 [de-identified] : multiple symptomatic events consistent with ST/SVT

## 2022-11-17 ENCOUNTER — APPOINTMENT (OUTPATIENT)
Dept: CARDIOLOGY | Facility: CLINIC | Age: 42
End: 2022-11-17

## 2022-11-29 ENCOUNTER — APPOINTMENT (OUTPATIENT)
Dept: CARDIOLOGY | Facility: CLINIC | Age: 42
End: 2022-11-29

## 2022-11-29 ENCOUNTER — NON-APPOINTMENT (OUTPATIENT)
Age: 42
End: 2022-11-29

## 2022-11-29 PROCEDURE — 93298 REM INTERROG DEV EVAL SCRMS: CPT

## 2022-11-29 PROCEDURE — G2066: CPT

## 2023-01-04 ENCOUNTER — APPOINTMENT (OUTPATIENT)
Dept: CARDIOLOGY | Facility: CLINIC | Age: 43
End: 2023-01-04
Payer: MEDICAID

## 2023-01-04 ENCOUNTER — NON-APPOINTMENT (OUTPATIENT)
Age: 43
End: 2023-01-04

## 2023-01-04 PROCEDURE — 93298 REM INTERROG DEV EVAL SCRMS: CPT

## 2023-01-04 PROCEDURE — G2066: CPT

## 2023-02-10 ENCOUNTER — APPOINTMENT (OUTPATIENT)
Dept: ELECTROPHYSIOLOGY | Facility: CLINIC | Age: 43
End: 2023-02-10
Payer: MEDICAID

## 2023-02-16 ENCOUNTER — APPOINTMENT (OUTPATIENT)
Dept: ELECTROPHYSIOLOGY | Facility: CLINIC | Age: 43
End: 2023-02-16
Payer: MEDICAID

## 2023-02-16 VITALS
HEART RATE: 81 BPM | DIASTOLIC BLOOD PRESSURE: 88 MMHG | SYSTOLIC BLOOD PRESSURE: 124 MMHG | HEIGHT: 63 IN | RESPIRATION RATE: 14 BRPM | BODY MASS INDEX: 23.92 KG/M2 | TEMPERATURE: 97 F | WEIGHT: 135 LBS

## 2023-02-16 DIAGNOSIS — R00.2 PALPITATIONS: ICD-10-CM

## 2023-02-16 DIAGNOSIS — Z45.09 ENCOUNTER FOR ADJUSTMENT AND MANAGEMENT OF OTHER CARDIAC DEVICE: ICD-10-CM

## 2023-02-16 DIAGNOSIS — I47.1 SUPRAVENTRICULAR TACHYCARDIA: ICD-10-CM

## 2023-02-16 PROCEDURE — 93285 PRGRMG DEV EVAL SCRMS IP: CPT

## 2023-02-16 PROCEDURE — 99214 OFFICE O/P EST MOD 30 MIN: CPT | Mod: 25

## 2023-02-16 PROCEDURE — 93000 ELECTROCARDIOGRAM COMPLETE: CPT | Mod: 59

## 2023-02-16 RX ORDER — IVABRADINE 5 MG/1
5 TABLET, FILM COATED ORAL
Qty: 30 | Refills: 5 | Status: DISCONTINUED | COMMUNITY
Start: 2022-10-31 | End: 2023-02-16

## 2023-02-16 RX ORDER — METOPROLOL SUCCINATE 25 MG/1
25 TABLET, EXTENDED RELEASE ORAL
Refills: 0 | Status: ACTIVE | COMMUNITY

## 2023-02-16 RX ORDER — SECUKINUMAB 150 MG/ML
150 INJECTION SUBCUTANEOUS
Refills: 0 | Status: ACTIVE | COMMUNITY
Start: 2021-12-03

## 2023-02-16 NOTE — ASSESSMENT
[FreeTextEntry1] : s/p [employer] loop implant on 9/9 for palpitations\par presents today for device interrogation and reassess loop insertion site\par \par # Palpitations s/p Jot implant\par - Occur less frequently but they usually wake patient from sleep. See symptom episode on print out, AT? SVT? 130s that self terminates\par - Patient did not start Corlanor. Patient not taking medication frequently, will take metoprolol when symptoms are persistent\par - ? if patient will need EP study, device tachy parameter lowered to 120 bpm\par \par # Implant site\par - Device prominent and uncomfortable for the patient. Keloid scar.\par - I recommend that patient have consultation with Dr. Edward prior to ILR removal. Appointment made for her on 3/10 at 12p and message left for patient\par - Can do explant with Dr. Davis and consider reimplant at that time in different location.\par - Discussed loop explant, risks benefits. - recommend loop recorder explant and possible reimplant\par \par I have explained the procedure to GABRIELA MENDEZ. I have explained the risks and benefits of the procedure to the patient. There is approximately 1% chance of any major cardiovascular complication to occur. Complications include, but are not limited to infection or bleeding, The patient understands the risk and would like to proceed with the procedure. Patient indicated that all of her questions were answered to her satisfaction and verbalized understanding. \par \par Will RTO in 2 months with Dr. Davis to discuss plan, does patient need EPS?\par

## 2023-02-16 NOTE — PHYSICAL EXAM
[General Appearance - Well Developed] : well developed [Normal Appearance] : normal appearance [Well Groomed] : well groomed [General Appearance - Well Nourished] : well nourished [No Deformities] : no deformities [General Appearance - In No Acute Distress] : no acute distress [Heart Rate And Rhythm] : heart rate and rhythm were normal [Heart Sounds] : normal S1 and S2 [Murmurs] : no murmurs present [] : no respiratory distress [Respiration, Rhythm And Depth] : normal respiratory rhythm and effort [Exaggerated Use Of Accessory Muscles For Inspiration] : no accessory muscle use [Auscultation Breath Sounds / Voice Sounds] : lungs were clear to auscultation bilaterally [Abdomen Soft] : soft [Well-Healed] : well-healed [Nail Clubbing] : no clubbing of the fingernails [Cyanosis, Localized] : no localized cyanosis [FreeTextEntry2] : Device prominent with edges visible under skin, keloid scar [FreeTextEntry1] : Left parasternal

## 2023-02-16 NOTE — PROCEDURE
[Sensing Amplitude ___mv] : sensing amplitude was [unfilled] mv [See Device Printout] : See device printout [de-identified] : Abbot Osiel MCLAUGHLIN ICM [de-identified] : [id number] [de-identified] : [id number] [de-identified] : 9/9/2022 [de-identified] : Tachy zone lowered to 120 bpm [de-identified] : 3 symptom episodes\par 1 is NSR - button pressed accidentally\par 1 is NSR with SVT 130s - woke patient from sleep\par 1 with no EGM

## 2023-02-16 NOTE — CARDIOLOGY SUMMARY
[de-identified] : 2/16/2023: sinus rhythm 81 bpm, non-sp T wave abnormalities\par 10/31/2022 128 sinus tach\par 10/17/2022 80 sinus rhythm \par 5/9/2022  Sinus tachycardia ( bpm) [de-identified] : none since her 20s\par (5/23/2022) there was no ischemia and no arrhythmia.\par  [de-identified] : (5/19/2022) ejection fraction 66% with mild tricuspid regurgitation.\par 12/23/2019 Tech difficult study. EF 60-65%

## 2023-02-16 NOTE — CARDIOLOGY SUMMARY
[de-identified] : 2/16/2023: sinus rhythm 81 bpm, non-sp T wave abnormalities\par 10/31/2022 128 sinus tach\par 10/17/2022 80 sinus rhythm \par 5/9/2022  Sinus tachycardia ( bpm) [de-identified] : none since her 20s\par (5/23/2022) there was no ischemia and no arrhythmia.\par  [de-identified] : (5/19/2022) ejection fraction 66% with mild tricuspid regurgitation.\par 12/23/2019 Tech difficult study. EF 60-65%

## 2023-02-16 NOTE — PROCEDURE
[Sensing Amplitude ___mv] : sensing amplitude was [unfilled] mv [See Device Printout] : See device printout [de-identified] : Abbot Osiel MCLAUGHLIN ICM [de-identified] : [id number] [de-identified] : [id number] [de-identified] : 9/9/2022 [de-identified] : Tachy zone lowered to 120 bpm [de-identified] : 3 symptom episodes\par 1 is NSR - button pressed accidentally\par 1 is NSR with SVT 130s - woke patient from sleep\par 1 with no EGM

## 2023-02-16 NOTE — HISTORY OF PRESENT ILLNESS
[FreeTextEntry1] : cardiologist: Dr. Inman\par Cardiologist: Dr. 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 Patient states she underwent 4 ablations for "SVT"  (2 at [facility] and 2 at [facility]). She was told ablations were unsuccessful and she was informed she might need a pacemaker.  This was all in her 20s. She states she tried meds and lost weight. Symptoms became tolerable for the last 20 years.\par \par \par A [employer] lona loop recorder was implanted on 9/9/2022.\par Presents for follow up.\par \par The device is painful in her chest, especially with any pressure on the area. She still has palpitations but less frequently. When she has palpitations, they mostly wake her up at night. \par

## 2023-02-16 NOTE — HISTORY OF PRESENT ILLNESS
[FreeTextEntry1] : cardiologist: Dr. Inman\par Cardiologist: Dr. 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 Patient states she underwent 4 ablations for "SVT"  (2 at [facility] and 2 at [place]). She was told ablations were unsuccessful and she was informed she might need a pacemaker.  This was all in her 20s. She states she tried meds and lost weight. Symptoms became tolerable for the last 20 years.\par \par \par A [employer] lona loop recorder was implanted on 9/9/2022.\par Presents for follow up.\par \par The device is painful in her chest, especially with any pressure on the area. She still has palpitations but less frequently. When she has palpitations, they mostly wake her up at night. \par

## 2023-03-10 ENCOUNTER — APPOINTMENT (OUTPATIENT)
Dept: PLASTIC SURGERY | Facility: CLINIC | Age: 43
End: 2023-03-10
Payer: MEDICAID

## 2023-03-10 VITALS — BODY MASS INDEX: 24.84 KG/M2 | HEIGHT: 62 IN | WEIGHT: 135 LBS

## 2023-03-10 PROCEDURE — 99203 OFFICE O/P NEW LOW 30 MIN: CPT

## 2023-03-14 NOTE — ASSESSMENT
[FreeTextEntry1] : to have cards remove left chest loop reciorder\par asked to assist in closure\par \par Regarding the procedure, we discussed scarring, poor wound healing, bleeding, infection, need for additional surgery, and dissatisfaction with the outcome.  Also discussed possibility of keloid and/or hypertrophic scar formation as well as recurrence.  All questions were answered and risks understood.\par \par Due to COVID 19, pre-visit patient instructions were explained to the patient and their symptoms were checked upon arrival.  \par Masks were used by the health care providers and staff and the examination room was cleaned after the patient visit was completed.\par \par Photos taken with patient permission.\par \par Will contact card\par \par concern for possible intra or extracapsular breast implant rupture\par need MRI to assess integrity of implants that have been in place for over ten years

## 2023-03-14 NOTE — HISTORY OF PRESENT ILLNESS
[FreeTextEntry1] : 42 yr old woman\par have loop recorder in (for SVT--had four ablations)\par had bilateral breast implants Dec 2011\par nonsmoker\par nondiabetic\par works as dental hygienist\par \par four children,  (kids 24, 20, 18, 13)\par

## 2023-03-16 ENCOUNTER — NON-APPOINTMENT (OUTPATIENT)
Age: 43
End: 2023-03-16

## 2023-03-16 ENCOUNTER — APPOINTMENT (OUTPATIENT)
Dept: CARDIOLOGY | Facility: CLINIC | Age: 43
End: 2023-03-16

## 2023-03-17 ENCOUNTER — APPOINTMENT (OUTPATIENT)
Dept: CARDIOLOGY | Facility: CLINIC | Age: 43
End: 2023-03-17

## 2023-03-27 ENCOUNTER — EMERGENCY (EMERGENCY)
Facility: HOSPITAL | Age: 43
LOS: 0 days | Discharge: LEFT BEFORE DISCHARGE | End: 2023-03-27
Payer: MEDICAID

## 2023-03-27 DIAGNOSIS — R10.9 UNSPECIFIED ABDOMINAL PAIN: ICD-10-CM

## 2023-03-27 DIAGNOSIS — R07.89 OTHER CHEST PAIN: ICD-10-CM

## 2023-03-27 DIAGNOSIS — Z86.59 PERSONAL HISTORY OF OTHER MENTAL AND BEHAVIORAL DISORDERS: ICD-10-CM

## 2023-03-27 DIAGNOSIS — R10.2 PELVIC AND PERINEAL PAIN: ICD-10-CM

## 2023-03-27 DIAGNOSIS — Z98.890 OTHER SPECIFIED POSTPROCEDURAL STATES: Chronic | ICD-10-CM

## 2023-03-27 DIAGNOSIS — Z88.5 ALLERGY STATUS TO NARCOTIC AGENT: ICD-10-CM

## 2023-03-27 DIAGNOSIS — Z95.818 PRESENCE OF OTHER CARDIAC IMPLANTS AND GRAFTS: ICD-10-CM

## 2023-03-27 DIAGNOSIS — Z86.79 PERSONAL HISTORY OF OTHER DISEASES OF THE CIRCULATORY SYSTEM: ICD-10-CM

## 2023-03-27 DIAGNOSIS — Z53.29 PROCEDURE AND TREATMENT NOT CARRIED OUT BECAUSE OF PATIENT'S DECISION FOR OTHER REASONS: ICD-10-CM

## 2023-03-27 LAB
ALBUMIN SERPL ELPH-MCNC: 5 G/DL — SIGNIFICANT CHANGE UP (ref 3.5–5.2)
ALP SERPL-CCNC: 67 U/L — SIGNIFICANT CHANGE UP (ref 30–115)
ALT FLD-CCNC: 48 U/L — HIGH (ref 0–41)
ANION GAP SERPL CALC-SCNC: 16 MMOL/L — HIGH (ref 7–14)
APPEARANCE UR: CLEAR — SIGNIFICANT CHANGE UP
AST SERPL-CCNC: 195 U/L — HIGH (ref 0–41)
BACTERIA # UR AUTO: SIGNIFICANT CHANGE UP /HPF
BILIRUB SERPL-MCNC: 0.5 MG/DL — SIGNIFICANT CHANGE UP (ref 0.2–1.2)
BILIRUB UR-MCNC: NEGATIVE — SIGNIFICANT CHANGE UP
BUN SERPL-MCNC: 6 MG/DL — LOW (ref 10–20)
CALCIUM SERPL-MCNC: 9.3 MG/DL — SIGNIFICANT CHANGE UP (ref 8.4–10.5)
CHLORIDE SERPL-SCNC: 107 MMOL/L — SIGNIFICANT CHANGE UP (ref 98–110)
CO2 SERPL-SCNC: 24 MMOL/L — SIGNIFICANT CHANGE UP (ref 17–32)
COLOR SPEC: YELLOW — SIGNIFICANT CHANGE UP
CREAT SERPL-MCNC: 0.6 MG/DL — LOW (ref 0.7–1.5)
DIFF PNL FLD: ABNORMAL
EGFR: 116 ML/MIN/1.73M2 — SIGNIFICANT CHANGE UP
EPI CELLS # UR: ABNORMAL /HPF
GLUCOSE SERPL-MCNC: 112 MG/DL — HIGH (ref 70–99)
GLUCOSE UR QL: NEGATIVE MG/DL — SIGNIFICANT CHANGE UP
HCT VFR BLD CALC: 39.4 % — SIGNIFICANT CHANGE UP (ref 37–47)
HGB BLD-MCNC: 14 G/DL — SIGNIFICANT CHANGE UP (ref 12–16)
KETONES UR-MCNC: NEGATIVE — SIGNIFICANT CHANGE UP
LEUKOCYTE ESTERASE UR-ACNC: NEGATIVE — SIGNIFICANT CHANGE UP
LIDOCAIN IGE QN: 48 U/L — SIGNIFICANT CHANGE UP (ref 7–60)
MAGNESIUM SERPL-MCNC: 1.7 MG/DL — LOW (ref 1.8–2.4)
MCHC RBC-ENTMCNC: 34.6 PG — HIGH (ref 27–31)
MCHC RBC-ENTMCNC: 35.5 G/DL — SIGNIFICANT CHANGE UP (ref 32–37)
MCV RBC AUTO: 97.3 FL — SIGNIFICANT CHANGE UP (ref 81–99)
NITRITE UR-MCNC: NEGATIVE — SIGNIFICANT CHANGE UP
NRBC # BLD: 0 /100 WBCS — SIGNIFICANT CHANGE UP (ref 0–0)
PH UR: 6 — SIGNIFICANT CHANGE UP (ref 5–8)
PLATELET # BLD AUTO: 131 K/UL — SIGNIFICANT CHANGE UP (ref 130–400)
POTASSIUM SERPL-MCNC: 4 MMOL/L — SIGNIFICANT CHANGE UP (ref 3.5–5)
POTASSIUM SERPL-SCNC: 4 MMOL/L — SIGNIFICANT CHANGE UP (ref 3.5–5)
PROT SERPL-MCNC: 8.1 G/DL — HIGH (ref 6–8)
PROT UR-MCNC: ABNORMAL MG/DL
RBC # BLD: 4.05 M/UL — LOW (ref 4.2–5.4)
RBC # FLD: 11.4 % — LOW (ref 11.5–14.5)
RBC CASTS # UR COMP ASSIST: SIGNIFICANT CHANGE UP /HPF
SODIUM SERPL-SCNC: 147 MMOL/L — HIGH (ref 135–146)
SP GR SPEC: 1.01 — SIGNIFICANT CHANGE UP (ref 1.01–1.03)
UROBILINOGEN FLD QL: 0.2 MG/DL — SIGNIFICANT CHANGE UP
WBC # BLD: 3.28 K/UL — LOW (ref 4.8–10.8)
WBC # FLD AUTO: 3.28 K/UL — LOW (ref 4.8–10.8)
WBC UR QL: SIGNIFICANT CHANGE UP /HPF

## 2023-03-27 PROCEDURE — 83735 ASSAY OF MAGNESIUM: CPT

## 2023-03-27 PROCEDURE — L9992: CPT

## 2023-03-27 PROCEDURE — 87086 URINE CULTURE/COLONY COUNT: CPT

## 2023-03-27 PROCEDURE — 85027 COMPLETE CBC AUTOMATED: CPT

## 2023-03-27 PROCEDURE — 93010 ELECTROCARDIOGRAM REPORT: CPT

## 2023-03-27 PROCEDURE — 96375 TX/PRO/DX INJ NEW DRUG ADDON: CPT

## 2023-03-27 PROCEDURE — 76856 US EXAM PELVIC COMPLETE: CPT

## 2023-03-27 PROCEDURE — 80053 COMPREHEN METABOLIC PANEL: CPT

## 2023-03-27 PROCEDURE — 83690 ASSAY OF LIPASE: CPT

## 2023-03-27 PROCEDURE — 81001 URINALYSIS AUTO W/SCOPE: CPT

## 2023-03-27 PROCEDURE — 76856 US EXAM PELVIC COMPLETE: CPT | Mod: 26

## 2023-03-27 PROCEDURE — 36415 COLL VENOUS BLD VENIPUNCTURE: CPT

## 2023-03-27 PROCEDURE — 96374 THER/PROPH/DIAG INJ IV PUSH: CPT

## 2023-03-27 PROCEDURE — 93005 ELECTROCARDIOGRAM TRACING: CPT

## 2023-03-27 PROCEDURE — 99285 EMERGENCY DEPT VISIT HI MDM: CPT | Mod: 25

## 2023-03-28 VITALS
DIASTOLIC BLOOD PRESSURE: 81 MMHG | HEART RATE: 94 BPM | OXYGEN SATURATION: 98 % | RESPIRATION RATE: 18 BRPM | WEIGHT: 134.92 LBS | SYSTOLIC BLOOD PRESSURE: 128 MMHG | TEMPERATURE: 97 F

## 2023-03-28 LAB
CULTURE RESULTS: SIGNIFICANT CHANGE UP
SPECIMEN SOURCE: SIGNIFICANT CHANGE UP

## 2023-03-28 NOTE — ED ADULT TRIAGE NOTE - CHIEF COMPLAINT QUOTE
pt seen during downtime. BIBA from home c/o opoid withdrawal, chest pain, has loop recorder, abdominal pain and recent etoh use

## 2023-03-29 ENCOUNTER — OUTPATIENT (OUTPATIENT)
Dept: OUTPATIENT SERVICES | Facility: HOSPITAL | Age: 43
LOS: 1 days | Discharge: HOME | End: 2023-03-29
Payer: MEDICAID

## 2023-03-29 ENCOUNTER — APPOINTMENT (OUTPATIENT)
Dept: ELECTROPHYSIOLOGY | Facility: HOSPITAL | Age: 43
End: 2023-03-29

## 2023-03-29 ENCOUNTER — APPOINTMENT (OUTPATIENT)
Dept: PLASTIC SURGERY | Facility: AMBULATORY SURGERY CENTER | Age: 43
End: 2023-03-29
Payer: MEDICAID

## 2023-03-29 DIAGNOSIS — I47.29 OTHER VENTRICULAR TACHYCARDIA: ICD-10-CM

## 2023-03-29 DIAGNOSIS — R00.0 TACHYCARDIA, UNSPECIFIED: ICD-10-CM

## 2023-03-29 DIAGNOSIS — Z98.890 OTHER SPECIFIED POSTPROCEDURAL STATES: Chronic | ICD-10-CM

## 2023-03-29 PROCEDURE — 14000 TIS TRNFR TRUNK 10 SQ CM/<: CPT

## 2023-03-29 PROCEDURE — 33286 RMVL SUBQ CAR RHYTHM MNTR: CPT

## 2023-03-29 RX ORDER — CEPHALEXIN 500 MG
500 CAPSULE ORAL ONCE
Refills: 0 | Status: DISCONTINUED | OUTPATIENT
Start: 2023-03-29 | End: 2023-03-29

## 2023-04-05 ENCOUNTER — APPOINTMENT (OUTPATIENT)
Dept: CARDIOLOGY | Facility: CLINIC | Age: 43
End: 2023-04-05

## 2023-04-06 ENCOUNTER — APPOINTMENT (OUTPATIENT)
Dept: PLASTIC SURGERY | Facility: CLINIC | Age: 43
End: 2023-04-06
Payer: MEDICAID

## 2023-04-06 DIAGNOSIS — S21.002A UNSPECIFIED OPEN WOUND OF LEFT BREAST, INITIAL ENCOUNTER: ICD-10-CM

## 2023-04-06 PROCEDURE — 99024 POSTOP FOLLOW-UP VISIT: CPT

## 2023-04-06 NOTE — HISTORY OF PRESENT ILLNESS
[FreeTextEntry1] : 42 yr old woman with PMHx of SVT s/p several cardiac ablations and BL cosmetic breast augmentation December 2011 who has a loop recorder in (for SVT)\par Also pt is concerned for possible intra or extracapsular breast implant rupture\par need MRI to assess integrity of implants that have been in place for over ten years\par \par nonsmoker\par nondiabetic\par works as dental hygienist\par \par four children,  (kids 24, 20, 18, 13)\par \par Interval hx (4/6/23). Pt here POD#9 s/p left chest wound closure after removal of a loop recorder by Dr. Sawant. Doing well with no significant pain, f/c or bleeding. \par

## 2023-04-06 NOTE — PHYSICAL EXAM
[NI] : Normal [de-identified] : well developed female, NAD [de-identified] : left medial chest/breast incision healing well with scattered bruising and swelling, no erythema, no hematoma, good cosmesis [de-identified] : BL breast implants soft, nontender

## 2023-04-06 NOTE — ASSESSMENT
[FreeTextEntry1] : 41 yo F with h/o BL cosmetic breast augmentation POD#9 s/p left chest wound closure after removal of a loop recorder by Dr. Sawant. Doing well. \par \par - suture tails removed, steri strips applied\par - reassured about bruising\par - may shower\par - daily Aquaphor\par - start Scarguard to incision in 1 week to minimize scarring\par - post-op instructions reviewed and all her questions were answered \par - f/u 6 weeks with Dr. Edward.

## 2023-05-01 ENCOUNTER — APPOINTMENT (OUTPATIENT)
Dept: CARDIOLOGY | Facility: CLINIC | Age: 43
End: 2023-05-01

## 2023-05-25 ENCOUNTER — APPOINTMENT (OUTPATIENT)
Dept: PLASTIC SURGERY | Facility: CLINIC | Age: 43
End: 2023-05-25

## 2023-11-07 ENCOUNTER — EMERGENCY (EMERGENCY)
Facility: HOSPITAL | Age: 43
LOS: 0 days | Discharge: HOME | End: 2023-11-07
Attending: EMERGENCY MEDICINE
Payer: MEDICAID

## 2023-11-07 VITALS
SYSTOLIC BLOOD PRESSURE: 119 MMHG | DIASTOLIC BLOOD PRESSURE: 86 MMHG | TEMPERATURE: 97 F | OXYGEN SATURATION: 97 % | HEART RATE: 95 BPM | RESPIRATION RATE: 18 BRPM

## 2023-11-07 VITALS
OXYGEN SATURATION: 99 % | SYSTOLIC BLOOD PRESSURE: 157 MMHG | WEIGHT: 134.92 LBS | DIASTOLIC BLOOD PRESSURE: 95 MMHG | HEART RATE: 95 BPM | HEIGHT: 63 IN | RESPIRATION RATE: 18 BRPM | TEMPERATURE: 98 F

## 2023-11-07 DIAGNOSIS — Z98.890 OTHER SPECIFIED POSTPROCEDURAL STATES: Chronic | ICD-10-CM

## 2023-11-07 DIAGNOSIS — R20.2 PARESTHESIA OF SKIN: ICD-10-CM

## 2023-11-07 DIAGNOSIS — E83.42 HYPOMAGNESEMIA: ICD-10-CM

## 2023-11-07 DIAGNOSIS — Z88.5 ALLERGY STATUS TO NARCOTIC AGENT: ICD-10-CM

## 2023-11-07 DIAGNOSIS — R00.0 TACHYCARDIA, UNSPECIFIED: ICD-10-CM

## 2023-11-07 DIAGNOSIS — M79.675 PAIN IN LEFT TOE(S): ICD-10-CM

## 2023-11-07 DIAGNOSIS — R20.0 ANESTHESIA OF SKIN: ICD-10-CM

## 2023-11-07 DIAGNOSIS — M79.674 PAIN IN RIGHT TOE(S): ICD-10-CM

## 2023-11-07 DIAGNOSIS — F10.20 ALCOHOL DEPENDENCE, UNCOMPLICATED: ICD-10-CM

## 2023-11-07 DIAGNOSIS — Z86.79 PERSONAL HISTORY OF OTHER DISEASES OF THE CIRCULATORY SYSTEM: ICD-10-CM

## 2023-11-07 LAB
ALBUMIN SERPL ELPH-MCNC: 5.1 G/DL — SIGNIFICANT CHANGE UP (ref 3.5–5.2)
ALBUMIN SERPL ELPH-MCNC: 5.1 G/DL — SIGNIFICANT CHANGE UP (ref 3.5–5.2)
ALP SERPL-CCNC: 100 U/L — SIGNIFICANT CHANGE UP (ref 30–115)
ALP SERPL-CCNC: 100 U/L — SIGNIFICANT CHANGE UP (ref 30–115)
ALT FLD-CCNC: 31 U/L — SIGNIFICANT CHANGE UP (ref 0–41)
ALT FLD-CCNC: 31 U/L — SIGNIFICANT CHANGE UP (ref 0–41)
ANION GAP SERPL CALC-SCNC: 17 MMOL/L — HIGH (ref 7–14)
ANION GAP SERPL CALC-SCNC: 17 MMOL/L — HIGH (ref 7–14)
APAP SERPL-MCNC: <5 UG/ML — LOW (ref 10–30)
APAP SERPL-MCNC: <5 UG/ML — LOW (ref 10–30)
APPEARANCE UR: CLEAR — SIGNIFICANT CHANGE UP
APPEARANCE UR: CLEAR — SIGNIFICANT CHANGE UP
APTT BLD: 36.7 SEC — SIGNIFICANT CHANGE UP (ref 27–39.2)
APTT BLD: 36.7 SEC — SIGNIFICANT CHANGE UP (ref 27–39.2)
AST SERPL-CCNC: 172 U/L — HIGH (ref 0–41)
AST SERPL-CCNC: 172 U/L — HIGH (ref 0–41)
BASE EXCESS BLDV CALC-SCNC: 8 MMOL/L — HIGH (ref -2–3)
BASE EXCESS BLDV CALC-SCNC: 8 MMOL/L — HIGH (ref -2–3)
BASOPHILS # BLD AUTO: 0.09 K/UL — SIGNIFICANT CHANGE UP (ref 0–0.2)
BASOPHILS # BLD AUTO: 0.09 K/UL — SIGNIFICANT CHANGE UP (ref 0–0.2)
BASOPHILS NFR BLD AUTO: 2.2 % — HIGH (ref 0–1)
BASOPHILS NFR BLD AUTO: 2.2 % — HIGH (ref 0–1)
BILIRUB SERPL-MCNC: 0.6 MG/DL — SIGNIFICANT CHANGE UP (ref 0.2–1.2)
BILIRUB SERPL-MCNC: 0.6 MG/DL — SIGNIFICANT CHANGE UP (ref 0.2–1.2)
BILIRUB UR-MCNC: NEGATIVE — SIGNIFICANT CHANGE UP
BILIRUB UR-MCNC: NEGATIVE — SIGNIFICANT CHANGE UP
BUN SERPL-MCNC: 6 MG/DL — LOW (ref 10–20)
BUN SERPL-MCNC: 6 MG/DL — LOW (ref 10–20)
CA-I SERPL-SCNC: 1.1 MMOL/L — LOW (ref 1.15–1.33)
CA-I SERPL-SCNC: 1.1 MMOL/L — LOW (ref 1.15–1.33)
CALCIUM SERPL-MCNC: 9.6 MG/DL — SIGNIFICANT CHANGE UP (ref 8.4–10.5)
CALCIUM SERPL-MCNC: 9.6 MG/DL — SIGNIFICANT CHANGE UP (ref 8.4–10.5)
CHLORIDE SERPL-SCNC: 100 MMOL/L — SIGNIFICANT CHANGE UP (ref 98–110)
CHLORIDE SERPL-SCNC: 100 MMOL/L — SIGNIFICANT CHANGE UP (ref 98–110)
CO2 SERPL-SCNC: 27 MMOL/L — SIGNIFICANT CHANGE UP (ref 17–32)
CO2 SERPL-SCNC: 27 MMOL/L — SIGNIFICANT CHANGE UP (ref 17–32)
COLOR SPEC: YELLOW — SIGNIFICANT CHANGE UP
COLOR SPEC: YELLOW — SIGNIFICANT CHANGE UP
CREAT SERPL-MCNC: 0.6 MG/DL — LOW (ref 0.7–1.5)
CREAT SERPL-MCNC: 0.6 MG/DL — LOW (ref 0.7–1.5)
DIFF PNL FLD: NEGATIVE — SIGNIFICANT CHANGE UP
DIFF PNL FLD: NEGATIVE — SIGNIFICANT CHANGE UP
EGFR: 115 ML/MIN/1.73M2 — SIGNIFICANT CHANGE UP
EGFR: 115 ML/MIN/1.73M2 — SIGNIFICANT CHANGE UP
EOSINOPHIL # BLD AUTO: 0.11 K/UL — SIGNIFICANT CHANGE UP (ref 0–0.7)
EOSINOPHIL # BLD AUTO: 0.11 K/UL — SIGNIFICANT CHANGE UP (ref 0–0.7)
EOSINOPHIL NFR BLD AUTO: 2.7 % — SIGNIFICANT CHANGE UP (ref 0–8)
EOSINOPHIL NFR BLD AUTO: 2.7 % — SIGNIFICANT CHANGE UP (ref 0–8)
ETHANOL SERPL-MCNC: 459 MG/DL — HIGH
ETHANOL SERPL-MCNC: 459 MG/DL — HIGH
GAS PNL BLDV: 144 MMOL/L — SIGNIFICANT CHANGE UP (ref 136–145)
GAS PNL BLDV: 144 MMOL/L — SIGNIFICANT CHANGE UP (ref 136–145)
GAS PNL BLDV: SIGNIFICANT CHANGE UP
GAS PNL BLDV: SIGNIFICANT CHANGE UP
GLUCOSE SERPL-MCNC: 119 MG/DL — HIGH (ref 70–99)
GLUCOSE SERPL-MCNC: 119 MG/DL — HIGH (ref 70–99)
GLUCOSE UR QL: NEGATIVE MG/DL — SIGNIFICANT CHANGE UP
GLUCOSE UR QL: NEGATIVE MG/DL — SIGNIFICANT CHANGE UP
HCG SERPL QL: NEGATIVE — SIGNIFICANT CHANGE UP
HCG SERPL QL: NEGATIVE — SIGNIFICANT CHANGE UP
HCO3 BLDV-SCNC: 34 MMOL/L — HIGH (ref 22–29)
HCO3 BLDV-SCNC: 34 MMOL/L — HIGH (ref 22–29)
HCT VFR BLD CALC: 38.3 % — SIGNIFICANT CHANGE UP (ref 37–47)
HCT VFR BLD CALC: 38.3 % — SIGNIFICANT CHANGE UP (ref 37–47)
HCT VFR BLDA CALC: 41 % — SIGNIFICANT CHANGE UP (ref 39–51)
HCT VFR BLDA CALC: 41 % — SIGNIFICANT CHANGE UP (ref 39–51)
HGB BLD CALC-MCNC: 13.6 G/DL — SIGNIFICANT CHANGE UP (ref 12.6–17.4)
HGB BLD CALC-MCNC: 13.6 G/DL — SIGNIFICANT CHANGE UP (ref 12.6–17.4)
HGB BLD-MCNC: 13.8 G/DL — SIGNIFICANT CHANGE UP (ref 12–16)
HGB BLD-MCNC: 13.8 G/DL — SIGNIFICANT CHANGE UP (ref 12–16)
IMM GRANULOCYTES NFR BLD AUTO: 0.2 % — SIGNIFICANT CHANGE UP (ref 0.1–0.3)
IMM GRANULOCYTES NFR BLD AUTO: 0.2 % — SIGNIFICANT CHANGE UP (ref 0.1–0.3)
INR BLD: 1 RATIO — SIGNIFICANT CHANGE UP (ref 0.65–1.3)
INR BLD: 1 RATIO — SIGNIFICANT CHANGE UP (ref 0.65–1.3)
KETONES UR-MCNC: NEGATIVE MG/DL — SIGNIFICANT CHANGE UP
KETONES UR-MCNC: NEGATIVE MG/DL — SIGNIFICANT CHANGE UP
LACTATE BLDV-MCNC: 2.8 MMOL/L — HIGH (ref 0.5–2)
LACTATE BLDV-MCNC: 2.8 MMOL/L — HIGH (ref 0.5–2)
LEUKOCYTE ESTERASE UR-ACNC: NEGATIVE — SIGNIFICANT CHANGE UP
LEUKOCYTE ESTERASE UR-ACNC: NEGATIVE — SIGNIFICANT CHANGE UP
LYMPHOCYTES # BLD AUTO: 1.59 K/UL — SIGNIFICANT CHANGE UP (ref 1.2–3.4)
LYMPHOCYTES # BLD AUTO: 1.59 K/UL — SIGNIFICANT CHANGE UP (ref 1.2–3.4)
LYMPHOCYTES # BLD AUTO: 38.6 % — SIGNIFICANT CHANGE UP (ref 20.5–51.1)
LYMPHOCYTES # BLD AUTO: 38.6 % — SIGNIFICANT CHANGE UP (ref 20.5–51.1)
MAGNESIUM SERPL-MCNC: 1.7 MG/DL — LOW (ref 1.8–2.4)
MAGNESIUM SERPL-MCNC: 1.7 MG/DL — LOW (ref 1.8–2.4)
MCHC RBC-ENTMCNC: 34.5 PG — HIGH (ref 27–31)
MCHC RBC-ENTMCNC: 34.5 PG — HIGH (ref 27–31)
MCHC RBC-ENTMCNC: 36 G/DL — SIGNIFICANT CHANGE UP (ref 32–37)
MCHC RBC-ENTMCNC: 36 G/DL — SIGNIFICANT CHANGE UP (ref 32–37)
MCV RBC AUTO: 95.8 FL — SIGNIFICANT CHANGE UP (ref 81–99)
MCV RBC AUTO: 95.8 FL — SIGNIFICANT CHANGE UP (ref 81–99)
MONOCYTES # BLD AUTO: 0.22 K/UL — SIGNIFICANT CHANGE UP (ref 0.1–0.6)
MONOCYTES # BLD AUTO: 0.22 K/UL — SIGNIFICANT CHANGE UP (ref 0.1–0.6)
MONOCYTES NFR BLD AUTO: 5.3 % — SIGNIFICANT CHANGE UP (ref 1.7–9.3)
MONOCYTES NFR BLD AUTO: 5.3 % — SIGNIFICANT CHANGE UP (ref 1.7–9.3)
NEUTROPHILS # BLD AUTO: 2.1 K/UL — SIGNIFICANT CHANGE UP (ref 1.4–6.5)
NEUTROPHILS # BLD AUTO: 2.1 K/UL — SIGNIFICANT CHANGE UP (ref 1.4–6.5)
NEUTROPHILS NFR BLD AUTO: 51 % — SIGNIFICANT CHANGE UP (ref 42.2–75.2)
NEUTROPHILS NFR BLD AUTO: 51 % — SIGNIFICANT CHANGE UP (ref 42.2–75.2)
NITRITE UR-MCNC: NEGATIVE — SIGNIFICANT CHANGE UP
NITRITE UR-MCNC: NEGATIVE — SIGNIFICANT CHANGE UP
NRBC # BLD: 0 /100 WBCS — SIGNIFICANT CHANGE UP (ref 0–0)
NRBC # BLD: 0 /100 WBCS — SIGNIFICANT CHANGE UP (ref 0–0)
PCO2 BLDV: 51 MMHG — HIGH (ref 39–42)
PCO2 BLDV: 51 MMHG — HIGH (ref 39–42)
PH BLDV: 7.43 — SIGNIFICANT CHANGE UP (ref 7.32–7.43)
PH BLDV: 7.43 — SIGNIFICANT CHANGE UP (ref 7.32–7.43)
PH UR: 6.5 — SIGNIFICANT CHANGE UP (ref 5–8)
PH UR: 6.5 — SIGNIFICANT CHANGE UP (ref 5–8)
PLATELET # BLD AUTO: 116 K/UL — LOW (ref 130–400)
PLATELET # BLD AUTO: 116 K/UL — LOW (ref 130–400)
PMV BLD: 10.2 FL — SIGNIFICANT CHANGE UP (ref 7.4–10.4)
PMV BLD: 10.2 FL — SIGNIFICANT CHANGE UP (ref 7.4–10.4)
PO2 BLDV: 43 MMHG — SIGNIFICANT CHANGE UP
PO2 BLDV: 43 MMHG — SIGNIFICANT CHANGE UP
POTASSIUM BLDV-SCNC: 4 MMOL/L — SIGNIFICANT CHANGE UP (ref 3.5–5.1)
POTASSIUM BLDV-SCNC: 4 MMOL/L — SIGNIFICANT CHANGE UP (ref 3.5–5.1)
POTASSIUM SERPL-MCNC: 3.8 MMOL/L — SIGNIFICANT CHANGE UP (ref 3.5–5)
POTASSIUM SERPL-MCNC: 3.8 MMOL/L — SIGNIFICANT CHANGE UP (ref 3.5–5)
POTASSIUM SERPL-SCNC: 3.8 MMOL/L — SIGNIFICANT CHANGE UP (ref 3.5–5)
POTASSIUM SERPL-SCNC: 3.8 MMOL/L — SIGNIFICANT CHANGE UP (ref 3.5–5)
PROT SERPL-MCNC: 7.9 G/DL — SIGNIFICANT CHANGE UP (ref 6–8)
PROT SERPL-MCNC: 7.9 G/DL — SIGNIFICANT CHANGE UP (ref 6–8)
PROT UR-MCNC: SIGNIFICANT CHANGE UP MG/DL
PROT UR-MCNC: SIGNIFICANT CHANGE UP MG/DL
PROTHROM AB SERPL-ACNC: 11.4 SEC — SIGNIFICANT CHANGE UP (ref 9.95–12.87)
PROTHROM AB SERPL-ACNC: 11.4 SEC — SIGNIFICANT CHANGE UP (ref 9.95–12.87)
RBC # BLD: 4 M/UL — LOW (ref 4.2–5.4)
RBC # BLD: 4 M/UL — LOW (ref 4.2–5.4)
RBC # FLD: 11.9 % — SIGNIFICANT CHANGE UP (ref 11.5–14.5)
RBC # FLD: 11.9 % — SIGNIFICANT CHANGE UP (ref 11.5–14.5)
SALICYLATES SERPL-MCNC: <0.3 MG/DL — LOW (ref 4–30)
SALICYLATES SERPL-MCNC: <0.3 MG/DL — LOW (ref 4–30)
SAO2 % BLDV: 62.9 % — SIGNIFICANT CHANGE UP
SAO2 % BLDV: 62.9 % — SIGNIFICANT CHANGE UP
SODIUM SERPL-SCNC: 144 MMOL/L — SIGNIFICANT CHANGE UP (ref 135–146)
SODIUM SERPL-SCNC: 144 MMOL/L — SIGNIFICANT CHANGE UP (ref 135–146)
SP GR SPEC: <1.005 — LOW (ref 1–1.03)
SP GR SPEC: <1.005 — LOW (ref 1–1.03)
UROBILINOGEN FLD QL: 0.2 MG/DL — SIGNIFICANT CHANGE UP (ref 0.2–1)
UROBILINOGEN FLD QL: 0.2 MG/DL — SIGNIFICANT CHANGE UP (ref 0.2–1)
WBC # BLD: 4.12 K/UL — LOW (ref 4.8–10.8)
WBC # BLD: 4.12 K/UL — LOW (ref 4.8–10.8)
WBC # FLD AUTO: 4.12 K/UL — LOW (ref 4.8–10.8)
WBC # FLD AUTO: 4.12 K/UL — LOW (ref 4.8–10.8)

## 2023-11-07 PROCEDURE — 85730 THROMBOPLASTIN TIME PARTIAL: CPT

## 2023-11-07 PROCEDURE — 99285 EMERGENCY DEPT VISIT HI MDM: CPT | Mod: 25

## 2023-11-07 PROCEDURE — 83735 ASSAY OF MAGNESIUM: CPT

## 2023-11-07 PROCEDURE — 82803 BLOOD GASES ANY COMBINATION: CPT

## 2023-11-07 PROCEDURE — 82330 ASSAY OF CALCIUM: CPT

## 2023-11-07 PROCEDURE — 83605 ASSAY OF LACTIC ACID: CPT

## 2023-11-07 PROCEDURE — 71045 X-RAY EXAM CHEST 1 VIEW: CPT

## 2023-11-07 PROCEDURE — 96375 TX/PRO/DX INJ NEW DRUG ADDON: CPT

## 2023-11-07 PROCEDURE — 36415 COLL VENOUS BLD VENIPUNCTURE: CPT

## 2023-11-07 PROCEDURE — 84295 ASSAY OF SERUM SODIUM: CPT

## 2023-11-07 PROCEDURE — 93010 ELECTROCARDIOGRAM REPORT: CPT

## 2023-11-07 PROCEDURE — 85014 HEMATOCRIT: CPT

## 2023-11-07 PROCEDURE — 93005 ELECTROCARDIOGRAM TRACING: CPT

## 2023-11-07 PROCEDURE — 84132 ASSAY OF SERUM POTASSIUM: CPT

## 2023-11-07 PROCEDURE — 80053 COMPREHEN METABOLIC PANEL: CPT

## 2023-11-07 PROCEDURE — 81003 URINALYSIS AUTO W/O SCOPE: CPT

## 2023-11-07 PROCEDURE — 99285 EMERGENCY DEPT VISIT HI MDM: CPT

## 2023-11-07 PROCEDURE — 80307 DRUG TEST PRSMV CHEM ANLYZR: CPT | Mod: XU

## 2023-11-07 PROCEDURE — 85610 PROTHROMBIN TIME: CPT

## 2023-11-07 PROCEDURE — 85025 COMPLETE CBC W/AUTO DIFF WBC: CPT

## 2023-11-07 PROCEDURE — 87086 URINE CULTURE/COLONY COUNT: CPT

## 2023-11-07 PROCEDURE — 84703 CHORIONIC GONADOTROPIN ASSAY: CPT

## 2023-11-07 PROCEDURE — 96374 THER/PROPH/DIAG INJ IV PUSH: CPT

## 2023-11-07 PROCEDURE — 85018 HEMOGLOBIN: CPT

## 2023-11-07 PROCEDURE — 71045 X-RAY EXAM CHEST 1 VIEW: CPT | Mod: 26

## 2023-11-07 RX ORDER — THIAMINE MONONITRATE (VIT B1) 100 MG
500 TABLET ORAL ONCE
Refills: 0 | Status: COMPLETED | OUTPATIENT
Start: 2023-11-07 | End: 2023-11-07

## 2023-11-07 RX ORDER — ACETAMINOPHEN 500 MG
975 TABLET ORAL ONCE
Refills: 0 | Status: COMPLETED | OUTPATIENT
Start: 2023-11-07 | End: 2023-11-07

## 2023-11-07 RX ORDER — MAGNESIUM SULFATE 500 MG/ML
2 VIAL (ML) INJECTION ONCE
Refills: 0 | Status: COMPLETED | OUTPATIENT
Start: 2023-11-07 | End: 2023-11-07

## 2023-11-07 RX ORDER — SODIUM CHLORIDE 9 MG/ML
1000 INJECTION, SOLUTION INTRAVENOUS ONCE
Refills: 0 | Status: COMPLETED | OUTPATIENT
Start: 2023-11-07 | End: 2023-11-07

## 2023-11-07 RX ADMIN — Medication 150 GRAM(S): at 17:25

## 2023-11-07 RX ADMIN — Medication 975 MILLIGRAM(S): at 20:46

## 2023-11-07 RX ADMIN — SODIUM CHLORIDE 1000 MILLILITER(S): 9 INJECTION, SOLUTION INTRAVENOUS at 17:25

## 2023-11-07 RX ADMIN — Medication 50 MILLIGRAM(S): at 23:05

## 2023-11-07 RX ADMIN — Medication 105 MILLIGRAM(S): at 15:33

## 2023-11-07 RX ADMIN — Medication 1 TABLET(S): at 15:33

## 2023-11-07 NOTE — ED PROVIDER NOTE - NSFOLLOWUPCLINICS_GEN_ALL_ED_FT
[facility]  [facility]  [address] [place] [address]  Phone: [phone]  Fax:

## 2023-11-07 NOTE — ED PROVIDER NOTE - PATIENT PORTAL LINK FT
You can access the FollowMyHealth Patient Portal offered by [facility] by registering at the following website: [email or web address]. By joining [employer]’s FollowMyHealth portal, you will also be able to view your health information using other applications (apps) compatible with our system.

## 2023-11-07 NOTE — ED PROVIDER NOTE - NSFOLLOWUPINSTRUCTIONS_ED_ALL_ED_FT
CALL  CATCH TEAM TOMORROW MORNING  TO HELP SET YOU UP WITH DETOX:    [phone]      Take  daily Multivitamin        ALCOHOL DEPENDENCE - General Information     Alcohol Dependence    WHAT YOU NEED TO KNOW:    What is alcohol dependence? Alcohol dependence is the need to drink alcohol often to function in your daily life. You often drink large amounts of alcohol. Alcohol dependence is also known as alcoholism or alcohol use disorder. Alcoholism is a disease that can affect almost every part of your body.    What behaviors are common with alcohol dependence?     You keep drinking alcohol even if you know it increases your risk for health problems. Health problems include liver problems, stomach ulcers, high blood pressure, and stroke.      You develop a tolerance for alcohol. Tolerance means the amount of alcohol you usually drink no longer causes the effects you desire. You may need to drink even more alcohol to get its previous effects.      You put extra effort and time into drinking alcohol. You may often go to events or activities that will include drinking. You may also spend much of your time drinking alcohol or being with people who also drink.      You have withdrawal (physical or mental) symptoms after not drinking for a short period. The same amount of alcohol may be needed to relieve or prevent withdrawal symptoms. You may also have to drink to stop tremors (shakes) or to cure a hangover.      You crave alcohol. You may have a desire to drink more frequently and to drink larger amounts of alcohol.      You have problems decreasing or controlling alcohol use. You are not able to control your drinking habits. You keep going back to drinking even after you quit.      You spend less time doing more important things. You have trouble with social or daily activities at school, work, or home.    What increases my risk for alcohol dependence?     Family history      Depression or anxiety      Other substance abuse      Childhood trauma      Posttraumatic stress disorder      Other disorders, such as antisocial personality disorder and bipolar disorder    How is alcohol dependence treated? Your healthcare provider may admit you to the hospital to make sure you withdraw safely. Then you may need any of the following:    Medicines to decrease your craving for alcohol      Support groups such as Alcoholics Anonymous       Psychiatrist or psychologist for therapy       Admission to an inpatient facility for treatment for severe dependence    Where can I get more information about alcohol dependence?       [employer] [place] [employer] and [employer] D[employer]  [address] [place] [address], [address]  [facility],[place]  Phone: [phone]  Phone: [phone]  Web Address: http://www.ncadd.org            Alcoholics Anonymous  Web Address: http://www.aa.org      CARE AGREEMENT:    You have the right to help plan your care. Learn about your health condition and how it may be treated. Discuss treatment options with your healthcare providers to decide what care you want to receive. You always have the right to refuse treatment

## 2023-11-07 NOTE — ED PROVIDER NOTE - NS ED ATTENDING STATEMENT MOD
This was a shared visit with the GRANT. I reviewed and verified the documentation and independently performed the documented:

## 2023-11-07 NOTE — ED PROVIDER NOTE - OBJECTIVE STATEMENT
44 y/o female with hx of SVT s/p ablation, alcoholism last drink earlier today , drinks 1/2 pt of vodka presents to the ED for evaluation of tingling to b/l toes over past few days. patient denies any back pain , weakness to legs, ataxia, falls, head injury. patient denies rashes. patient started taking otc b12 one week ago. no sob, chest pain . patient c/o palpitations today without any dizziness. no perioral tingling or tingling to tongue.

## 2023-11-07 NOTE — ED PROVIDER NOTE - PHYSICAL EXAMINATION
generalized: normocephalic , well appearing, comfortable  eyes: PERRLA, EOMI, clear conjunctiva  resp: CTA, no resp distress, no chest wall tenderness or crepitation  cardiac: tachycardic, regular rhythm,  S1S2, no murmurs, no extrasystoles  abdomen: NT/ND, BSx4, no CVA tenderness, no palpable mass  msk: no calf tenderness or swelling  skin: no redness or rashes  neuro: AOx3, gait steady, speech clear, motor and sensory in tact

## 2023-11-07 NOTE — ED ADULT NURSE NOTE - CCCP TRG CHIEF CMPLNT
Problem: Problem Solving STGs  Goal: STG-Within one week, patient will  Description: 1) Individualized goal:  Complete simple attention tasks with min A required to achieve 80% accuracy.    2) Interventions:  SLP Self Care / ADL Training , SLP Cognitive Skill Development, and SLP Group Treatment      Outcome: NOT MET  Note: Mod A required for pt to complete simple attention tasks      Problem: Memory STGs  Goal: STG-Within one week, patient will  Description: 1) Individualized goal:  Recall new information related to pt's hospitalization with mod A required for use of functional memory strategies (I.e. memory notebook).    2) Interventions:  SLP Self Care / ADL Training , SLP Cognitive Skill Development, and SLP Group Treatment      Outcome: NOT MET  Note: Mod-max A required for pt to recall new information       toe pain

## 2023-11-07 NOTE — ED PROVIDER NOTE - CLINICAL SUMMARY MEDICAL DECISION MAKING FREE TEXT BOX
43yF istory of SVT status post multiple ablations, chronic alcohol abuse and dependence who drank today, opioid abuse and dependence who presents to the emergency department for evaluation of bilateral toe numbness for the past several weeks. warm toes with normal pulses and gross sensation intact, though patient reports feeling less than normal.    labs with hypomag 1.7, etoh 459.  Pt observed -  clinically sober,  discussed outpt detox and catch team referral, pt verbalizes understanding, librium given  prior to DC  Patient to be discharged from ED in well appearing condition. Any available test results were discussed with and printed  for patient.  Verbal instructions given, including instructions to return to ED immediately for any new, worsening, or concerning symptoms. Limitations of ED work up discussed.  Patient reports understanding of above with capacity and insight. Written discharge instructions additionally given, including follow-up plan.

## 2023-11-07 NOTE — ED PROVIDER NOTE - ATTENDING APP SHARED VISIT CONTRIBUTION OF CARE
I personally evaluated patient. I agree with the findings and plan with all documentation on chart except as documented  in my note.    43-year-old female past medical history of SVT status post multiple ablations, chronic alcohol abuse and dependence who drank today, opioid abuse and dependence who presents to the emergency department for evaluation of bilateral toe numbness for the past several weeks.  Patient has intermittent palpitations that have been going on for years and that is continued today.  Patient denies any falls or trauma.  Patient drank up to a pint of vodka today but denies any falls or injuries.  No fever or signs of infectious etiology.  Patient started over-the-counter B12 about a week ago.  She denies any abdominal pain.  Last menstrual period was last week.    On exam, vital signs reviewed.  Patient has positive alcohol on breath but is awake and answering questions appropriately.  Patient has a nonfocal neurological exam.  Patient has normal heart and lung exam and no signs of withdrawal at this time.  No abdominal tenderness.  Patient has warm toes with normal pulses and gross sensation intact, though patient reports feeling less than normal.  IV placed and will check electrolytes and magnesium, start thiamine and give multivitamin, fluids.  Will get EKG.  We will follow-up work-up and reassess.

## 2023-11-08 LAB
CULTURE RESULTS: SIGNIFICANT CHANGE UP
CULTURE RESULTS: SIGNIFICANT CHANGE UP
SPECIMEN SOURCE: SIGNIFICANT CHANGE UP
SPECIMEN SOURCE: SIGNIFICANT CHANGE UP

## 2025-04-30 NOTE — ED ADULT TRIAGE NOTE - HEIGHT IN INCHES
Additional Notes: *Suspect previous fungal infection that has now developed into a contact allergy Render Risk Assessment In Note?: no Detail Level: Simple 3
